# Patient Record
Sex: MALE | Race: WHITE | NOT HISPANIC OR LATINO | Employment: OTHER | URBAN - METROPOLITAN AREA
[De-identification: names, ages, dates, MRNs, and addresses within clinical notes are randomized per-mention and may not be internally consistent; named-entity substitution may affect disease eponyms.]

---

## 2019-10-08 ENCOUNTER — TRANSCRIBE ORDERS (OUTPATIENT)
Dept: ADMINISTRATIVE | Facility: HOSPITAL | Age: 79
End: 2019-10-08

## 2019-10-08 DIAGNOSIS — I25.10 CAD IN NATIVE ARTERY: Primary | ICD-10-CM

## 2019-10-08 DIAGNOSIS — I48.91 ATRIAL FIBRILLATION, UNSPECIFIED TYPE (HCC): ICD-10-CM

## 2019-10-09 ENCOUNTER — TRANSCRIBE ORDERS (OUTPATIENT)
Dept: ADMINISTRATIVE | Facility: HOSPITAL | Age: 79
End: 2019-10-09

## 2019-10-09 DIAGNOSIS — I25.10 DISEASE OF CARDIOVASCULAR SYSTEM: Primary | ICD-10-CM

## 2019-10-09 DIAGNOSIS — I48.20 CHRONIC ATRIAL FIBRILLATION (HCC): ICD-10-CM

## 2019-10-17 ENCOUNTER — HOSPITAL ENCOUNTER (OUTPATIENT)
Dept: NUCLEAR MEDICINE | Facility: HOSPITAL | Age: 79
Discharge: HOME | End: 2019-10-17

## 2019-10-17 DIAGNOSIS — I48.20 CHRONIC ATRIAL FIBRILLATION (HCC): ICD-10-CM

## 2019-10-17 DIAGNOSIS — I25.10 DISEASE OF CARDIOVASCULAR SYSTEM: ICD-10-CM

## 2019-10-17 LAB
BH CV STRESS BP STAGE 1: NORMAL
BH CV STRESS BP STAGE 2: NORMAL
BH CV STRESS DURATION MIN STAGE 1: 3
BH CV STRESS DURATION MIN STAGE 2: 1
BH CV STRESS DURATION SEC STAGE 1: 0
BH CV STRESS DURATION SEC STAGE 2: 35
BH CV STRESS GRADE STAGE 1: 10
BH CV STRESS GRADE STAGE 2: 12
BH CV STRESS HR STAGE 1: 118
BH CV STRESS HR STAGE 2: 85
BH CV STRESS METS STAGE 1: 5
BH CV STRESS METS STAGE 2: 7.5
BH CV STRESS PROTOCOL 1: NORMAL
BH CV STRESS PROTOCOL 2 COMMENTS STAGE 1: NORMAL
BH CV STRESS PROTOCOL 2 DOSE REGADENOSON STAGE 1: 0.4
BH CV STRESS PROTOCOL 2 DURATION MIN STAGE 1: 0
BH CV STRESS PROTOCOL 2 DURATION SEC STAGE 1: 10
BH CV STRESS PROTOCOL 2 STAGE 1: 1
BH CV STRESS PROTOCOL 2: NORMAL
BH CV STRESS RECOVERY BP: NORMAL MMHG
BH CV STRESS RECOVERY HR: 56 BPM
BH CV STRESS SPEED STAGE 1: 1.7
BH CV STRESS SPEED STAGE 2: 2.5
BH CV STRESS STAGE 1: 1
BH CV STRESS STAGE 2: 2
LV EF NUC BP: 70 %
MAXIMAL PREDICTED HEART RATE: 139 BPM
PERCENT MAX PREDICTED HR: 52.52 %
STRESS BASELINE BP: NORMAL MMHG
STRESS BASELINE HR: 53 BPM
STRESS PERCENT HR: 62 %
STRESS POST PEAK BP: NORMAL MMHG
STRESS POST PEAK HR: 73 BPM
STRESS TARGET HR: 118 BPM

## 2019-10-17 PROCEDURE — 93016 CV STRESS TEST SUPVJ ONLY: CPT | Performed by: INTERNAL MEDICINE

## 2019-10-17 PROCEDURE — 78452 HT MUSCLE IMAGE SPECT MULT: CPT | Performed by: INTERNAL MEDICINE

## 2019-10-17 PROCEDURE — 93017 CV STRESS TEST TRACING ONLY: CPT

## 2019-10-17 PROCEDURE — 0 TECHNETIUM SESTAMIBI: Performed by: INTERNAL MEDICINE

## 2019-10-17 PROCEDURE — A9500 TC99M SESTAMIBI: HCPCS | Performed by: INTERNAL MEDICINE

## 2019-10-17 PROCEDURE — 25010000002 REGADENOSON 0.4 MG/5ML SOLUTION: Performed by: INTERNAL MEDICINE

## 2019-10-17 PROCEDURE — 78452 HT MUSCLE IMAGE SPECT MULT: CPT

## 2019-10-17 PROCEDURE — 93018 CV STRESS TEST I&R ONLY: CPT | Performed by: INTERNAL MEDICINE

## 2019-10-17 RX ORDER — ATORVASTATIN CALCIUM 10 MG/1
1 TABLET, FILM COATED ORAL NIGHTLY
COMMUNITY
Start: 2019-07-28 | End: 2021-08-22

## 2019-10-17 RX ORDER — HYDROCHLOROTHIAZIDE 12.5 MG/1
1 TABLET ORAL DAILY
COMMUNITY
Start: 2019-10-06 | End: 2021-03-31

## 2019-10-17 RX ORDER — LEVOTHYROXINE, LIOTHYRONINE 38; 9 UG/1; UG/1
1 TABLET ORAL DAILY
COMMUNITY
Start: 2019-07-28 | End: 2021-06-02 | Stop reason: SDUPTHER

## 2019-10-17 RX ORDER — AMLODIPINE BESYLATE 5 MG/1
0.5 TABLET ORAL DAILY
COMMUNITY
Start: 2019-07-20 | End: 2021-08-22

## 2019-10-17 RX ORDER — RIVAROXABAN 20 MG/1
1 TABLET, FILM COATED ORAL NIGHTLY
COMMUNITY
Start: 2019-09-17 | End: 2021-12-05

## 2019-10-17 RX ORDER — SOTALOL HYDROCHLORIDE 80 MG/1
1 TABLET ORAL 2 TIMES DAILY
COMMUNITY
Start: 2019-08-27 | End: 2021-11-22

## 2019-10-17 RX ORDER — TAMSULOSIN HYDROCHLORIDE 0.4 MG/1
1 CAPSULE ORAL 2 TIMES DAILY
COMMUNITY
Start: 2019-08-27 | End: 2022-02-18

## 2019-10-17 RX ORDER — CHOLECALCIFEROL (VITAMIN D3) 25 MCG
1 TABLET,CHEWABLE ORAL DAILY
COMMUNITY

## 2019-10-17 RX ADMIN — REGADENOSON 0.4 MG: 0.08 INJECTION, SOLUTION INTRAVENOUS at 09:34

## 2019-10-17 RX ADMIN — TECHNETIUM TC 99M SESTAMIBI 1 DOSE: 1 INJECTION INTRAVENOUS at 07:30

## 2019-10-17 RX ADMIN — TECHNETIUM TC 99M SESTAMIBI 1 DOSE: 1 INJECTION INTRAVENOUS at 09:34

## 2021-03-04 ENCOUNTER — TELEPHONE (OUTPATIENT)
Dept: INTERNAL MEDICINE | Facility: CLINIC | Age: 81
End: 2021-03-04

## 2021-03-04 NOTE — TELEPHONE ENCOUNTER
PATIENT STATES: that he would like to be on silduenafil 20mg prior authorization number is [id number]     PATIENT CAN BE REACHED ON:[phone]    [facility] [address] - [phone]  - [phone]   [phone]

## 2021-03-05 NOTE — TELEPHONE ENCOUNTER
He had not been seen in a while.  I would probably schedule an appointment if were going to start this.  It could be virtual visit

## 2021-03-16 ENCOUNTER — OFFICE VISIT (OUTPATIENT)
Dept: INTERNAL MEDICINE | Facility: CLINIC | Age: 81
End: 2021-03-16

## 2021-03-16 VITALS
HEART RATE: 80 BPM | BODY MASS INDEX: 27.06 KG/M2 | SYSTOLIC BLOOD PRESSURE: 110 MMHG | OXYGEN SATURATION: 97 % | RESPIRATION RATE: 16 BRPM | TEMPERATURE: 97.3 F | HEIGHT: 70 IN | DIASTOLIC BLOOD PRESSURE: 62 MMHG | WEIGHT: 189 LBS

## 2021-03-16 DIAGNOSIS — N40.0 BENIGN PROSTATIC HYPERPLASIA WITHOUT LOWER URINARY TRACT SYMPTOMS: ICD-10-CM

## 2021-03-16 DIAGNOSIS — Z12.5 SPECIAL SCREENING FOR MALIGNANT NEOPLASM OF PROSTATE: ICD-10-CM

## 2021-03-16 DIAGNOSIS — I48.11 LONGSTANDING PERSISTENT ATRIAL FIBRILLATION (HCC): ICD-10-CM

## 2021-03-16 DIAGNOSIS — E78.2 MIXED HYPERLIPIDEMIA: Primary | ICD-10-CM

## 2021-03-16 DIAGNOSIS — I10 ESSENTIAL HYPERTENSION: ICD-10-CM

## 2021-03-16 DIAGNOSIS — H61.23 CERUMEN DEBRIS ON TYMPANIC MEMBRANE OF BOTH EARS: ICD-10-CM

## 2021-03-16 PROCEDURE — 69210 REMOVE IMPACTED EAR WAX UNI: CPT | Performed by: INTERNAL MEDICINE

## 2021-03-16 PROCEDURE — 99214 OFFICE O/P EST MOD 30 MIN: CPT | Performed by: INTERNAL MEDICINE

## 2021-03-16 RX ORDER — SILDENAFIL CITRATE 20 MG/1
TABLET ORAL
Qty: 30 TABLET | Refills: 3 | Status: SHIPPED | OUTPATIENT
Start: 2021-03-16 | End: 2022-11-03 | Stop reason: ALTCHOICE

## 2021-03-16 NOTE — PROGRESS NOTES
"Chief Complaint  Atrial Fibrillation, Hyperlipidemia, and Hypertension    Subjective          Wilberto Madison presents to [facility]  History of Present Illness  Denies any significant headache, shortness of breath or chest pain.  No visual changes.  Taking medication without complication.  Overall he is doing well.  His wife has been moved to a different facility for her dementia.  He is still currently living in [place].  No medication changes.  He is interested in a prescription of sildenafil  Objective   Vital Signs:   /62 (BP Location: Left arm, Patient Position: Sitting, Cuff Size: Large Adult)   Pulse 80   Temp 97.3 °F (36.3 °C) (Temporal)   Resp 16   Ht 177.8 cm (70\")   Wt 85.7 kg (189 lb)   SpO2 97%   BMI 27.12 kg/m²     Physical Exam  Vitals and nursing note reviewed.   Constitutional:       Appearance: Normal appearance.   HENT:      Head: Normocephalic and atraumatic.   Cardiovascular:      Rate and Rhythm: Normal rate and regular rhythm.   Pulmonary:      Effort: Pulmonary effort is normal.      Breath sounds: Normal breath sounds.   Abdominal:      General: Abdomen is flat.      Palpations: Abdomen is soft.   Musculoskeletal:         General: No swelling or deformity.      Cervical back: Neck supple.      Right lower leg: No edema.      Left lower leg: No edema.   Skin:     General: Skin is warm.      Capillary Refill: Capillary refill takes less than 2 seconds.      Findings: No rash.   Neurological:      General: No focal deficit present.      Mental Status: He is alert and oriented to person, place, and time.        Result Review :                 Assessment and Plan atrial fibrillation stable.  No medication changes.  Hypothyroidism, recheck TSH  BPH in ED.  Flomax.  Sildenafil 20 mg prescription given.  Bilateral cerumen impaction.  Cleared via irrigation and cerumen spoon  Schedule wellness in 3 months  Ear Cerumen " Removal    Date/Time: 3/16/2021 1:27 PM  Performed by: Larry Schofield MD (Tony)  Authorized by: Larry Schofield MD (Tony)     Anesthesia:  Local Anesthetic: none  Location details: right ear and left ear  Patient tolerance: patient tolerated the procedure well with no immediate complications  Procedure type: instrumentation and irrigation   Sedation:  Patient sedated: no          Diagnoses and all orders for this visit:    1. Mixed hyperlipidemia (Primary)  -     CBC & Differential  -     Comprehensive Metabolic Panel  -     Lipid Panel With / Chol / HDL Ratio  -     TSH  -     PSA Screen    2. Essential hypertension  -     CBC & Differential  -     Comprehensive Metabolic Panel  -     Lipid Panel With / Chol / HDL Ratio  -     TSH  -     PSA Screen    3. Longstanding persistent atrial fibrillation (CMS/HCC)  -     CBC & Differential  -     Comprehensive Metabolic Panel  -     Lipid Panel With / Chol / HDL Ratio  -     TSH  -     PSA Screen    4. Benign prostatic hyperplasia without lower urinary tract symptoms  -     CBC & Differential  -     Comprehensive Metabolic Panel  -     Lipid Panel With / Chol / HDL Ratio  -     TSH  -     PSA Screen    5. Special screening for malignant neoplasm of prostate  -     PSA Screen    6. Cerumen debris on tympanic membrane of both ears  -     Ear Cerumen Removal    Other orders  -     sildenafil (REVATIO) 20 MG tablet; 1 to 2 tablets 2 hours prior to activity  Dispense: 30 tablet; Refill: 3        Follow Up   No follow-ups on file.  Patient was given instructions and counseling regarding his condition or for health maintenance advice. Please see specific information pulled into the AVS if appropriate.

## 2021-03-17 LAB
ALBUMIN SERPL-MCNC: 4 G/DL (ref 3.5–5.2)
ALBUMIN/GLOB SERPL: 1.7 G/DL
ALP SERPL-CCNC: 73 U/L (ref 39–117)
ALT SERPL-CCNC: 17 U/L (ref 1–41)
AST SERPL-CCNC: 21 U/L (ref 1–40)
BASOPHILS # BLD AUTO: 0.03 10*3/MM3 (ref 0–0.2)
BASOPHILS NFR BLD AUTO: 0.5 % (ref 0–1.5)
BILIRUB SERPL-MCNC: 0.8 MG/DL (ref 0–1.2)
BUN SERPL-MCNC: 17 MG/DL (ref 8–23)
BUN/CREAT SERPL: 14.5 (ref 7–25)
CALCIUM SERPL-MCNC: 9.2 MG/DL (ref 8.6–10.5)
CHLORIDE SERPL-SCNC: 96 MMOL/L (ref 98–107)
CHOLEST SERPL-MCNC: 112 MG/DL (ref 0–200)
CHOLEST/HDLC SERPL: 2.87 {RATIO}
CO2 SERPL-SCNC: 31.4 MMOL/L (ref 22–29)
CREAT SERPL-MCNC: 1.17 MG/DL (ref 0.76–1.27)
EOSINOPHIL # BLD AUTO: 0.13 10*3/MM3 (ref 0–0.4)
EOSINOPHIL NFR BLD AUTO: 2.1 % (ref 0.3–6.2)
ERYTHROCYTE [DISTWIDTH] IN BLOOD BY AUTOMATED COUNT: 11.7 % (ref 12.3–15.4)
GLOBULIN SER CALC-MCNC: 2.4 GM/DL
GLUCOSE SERPL-MCNC: 137 MG/DL (ref 65–99)
HCT VFR BLD AUTO: 46 % (ref 37.5–51)
HDLC SERPL-MCNC: 39 MG/DL (ref 40–60)
HGB BLD-MCNC: 15.6 G/DL (ref 13–17.7)
IMM GRANULOCYTES # BLD AUTO: 0.02 10*3/MM3 (ref 0–0.05)
IMM GRANULOCYTES NFR BLD AUTO: 0.3 % (ref 0–0.5)
LDLC SERPL CALC-MCNC: 53 MG/DL (ref 0–100)
LYMPHOCYTES # BLD AUTO: 0.86 10*3/MM3 (ref 0.7–3.1)
LYMPHOCYTES NFR BLD AUTO: 14.2 % (ref 19.6–45.3)
MCH RBC QN AUTO: 31 PG (ref 26.6–33)
MCHC RBC AUTO-ENTMCNC: 33.9 G/DL (ref 31.5–35.7)
MCV RBC AUTO: 91.5 FL (ref 79–97)
MONOCYTES # BLD AUTO: 0.39 10*3/MM3 (ref 0.1–0.9)
MONOCYTES NFR BLD AUTO: 6.4 % (ref 5–12)
NEUTROPHILS # BLD AUTO: 4.62 10*3/MM3 (ref 1.7–7)
NEUTROPHILS NFR BLD AUTO: 76.5 % (ref 42.7–76)
NRBC BLD AUTO-RTO: 0.2 /100 WBC (ref 0–0.2)
PLATELET # BLD AUTO: 197 10*3/MM3 (ref 140–450)
POTASSIUM SERPL-SCNC: 3.7 MMOL/L (ref 3.5–5.2)
PROT SERPL-MCNC: 6.4 G/DL (ref 6–8.5)
PSA SERPL-MCNC: 5.37 NG/ML (ref 0–4)
RBC # BLD AUTO: 5.03 10*6/MM3 (ref 4.14–5.8)
SODIUM SERPL-SCNC: 137 MMOL/L (ref 136–145)
TRIGL SERPL-MCNC: 107 MG/DL (ref 0–150)
TSH SERPL DL<=0.005 MIU/L-ACNC: 1.86 UIU/ML (ref 0.27–4.2)
VLDLC SERPL CALC-MCNC: 20 MG/DL (ref 5–40)
WBC # BLD AUTO: 6.05 10*3/MM3 (ref 3.4–10.8)

## 2021-03-24 ENCOUNTER — TELEPHONE (OUTPATIENT)
Dept: INTERNAL MEDICINE | Facility: CLINIC | Age: 81
End: 2021-03-24

## 2021-03-24 NOTE — TELEPHONE ENCOUNTER
Caller: Wilberto Madison    Relationship to patient: Self    Best call back number: [phone]    Patient is needing:     PATIENT CALLED IN WANTING TO LEAVE A MESSAGE FOR SETH    [facility] FOR sildenafil (REVATIO) 20 MG tablet  NEEDS PRIOR AUTHORIZATION BEFORE GETTING FILLED AND DELIVERED.   HE PROVIDED A PHONE NUMBER FOR US TO CALL IN ORDER TO ACCOMPLISH THIS.   [phone]       PLEASE CALL AND ADVISE: [phone]

## 2021-03-31 RX ORDER — HYDROCHLOROTHIAZIDE 12.5 MG/1
TABLET ORAL
Qty: 90 TABLET | Refills: 3 | Status: SHIPPED | OUTPATIENT
Start: 2021-03-31 | End: 2022-03-28

## 2021-05-27 ENCOUNTER — TELEPHONE (OUTPATIENT)
Dept: INTERNAL MEDICINE | Facility: CLINIC | Age: 81
End: 2021-05-27

## 2021-05-27 NOTE — TELEPHONE ENCOUNTER
Caller: Wilberto Madison    Relationship: Self    Best call back number: [phone]    What was the call regarding: PATIENT CALLED AND STATED THAT A NEW 90 DAY SCRIPT NEEDS TO BE SENT TO THE PHARMACY FOR NP THYROID 60 MG tablet.    [facility] - [address] [phone] [facility] [phone] FX

## 2021-05-28 NOTE — TELEPHONE ENCOUNTER
Looks was done in early May.  He needs an appointment anyway, has not been seen since [employer]

## 2021-06-02 ENCOUNTER — OFFICE VISIT (OUTPATIENT)
Dept: INTERNAL MEDICINE | Facility: CLINIC | Age: 81
End: 2021-06-02

## 2021-06-02 VITALS
DIASTOLIC BLOOD PRESSURE: 72 MMHG | TEMPERATURE: 97.1 F | BODY MASS INDEX: 27.35 KG/M2 | WEIGHT: 191 LBS | SYSTOLIC BLOOD PRESSURE: 120 MMHG | RESPIRATION RATE: 16 BRPM | OXYGEN SATURATION: 99 % | HEIGHT: 70 IN | HEART RATE: 52 BPM

## 2021-06-02 DIAGNOSIS — I48.11 LONGSTANDING PERSISTENT ATRIAL FIBRILLATION (HCC): Primary | ICD-10-CM

## 2021-06-02 DIAGNOSIS — I10 ESSENTIAL HYPERTENSION: ICD-10-CM

## 2021-06-02 DIAGNOSIS — Z12.11 COLON CANCER SCREENING: ICD-10-CM

## 2021-06-02 PROCEDURE — 99214 OFFICE O/P EST MOD 30 MIN: CPT | Performed by: INTERNAL MEDICINE

## 2021-06-02 RX ORDER — NITROGLYCERIN 0.4 MG/1
0.4 TABLET SUBLINGUAL
Status: DISCONTINUED | OUTPATIENT
Start: 2021-06-02 | End: 2021-06-02

## 2021-06-02 RX ORDER — NITROGLYCERIN 0.4 MG/1
0.4 TABLET SUBLINGUAL
Qty: 25 TABLET | Refills: 1 | Status: SHIPPED | OUTPATIENT
Start: 2021-06-02

## 2021-06-02 RX ORDER — LEVOTHYROXINE, LIOTHYRONINE 38; 9 UG/1; UG/1
60 TABLET ORAL DAILY
Qty: 90 TABLET | Refills: 1 | Status: SHIPPED | OUTPATIENT
Start: 2021-06-02 | End: 2021-11-11

## 2021-06-02 NOTE — PROGRESS NOTES
"Chief Complaint  Hyperlipidemia, Hypertension, and Hypothyroidism    Subjective          Wilberto Madison presents to [facility]  History of Present Illness  Denies any significant headache, shortness of breath or chest pain.  No visual changes.  Taking medication without complication.  Chronic atrial fibrillation no changes in medications.  He has been on the same medication for a while and was just questioning whether he needs to see cardiology  Erectile dysfunction.  He never got the sildenafil filled because of insurance complications.  He is currently living separate from his wife who has pretty significant dementia.  He sees her daily but is under quite a bit of stress related to that I think.  He was not interested in any medications regarding this.  Objective   Vital Signs:   /72 (BP Location: Left arm, Patient Position: Sitting, Cuff Size: Large Adult)   Pulse 52   Temp 97.1 °F (36.2 °C) (Temporal)   Resp 16   Ht 177.8 cm (70\")   Wt 86.6 kg (191 lb)   SpO2 99%   BMI 27.41 kg/m²     Physical Exam  Vitals and nursing note reviewed.   Constitutional:       Appearance: Normal appearance.   HENT:      Head: Normocephalic and atraumatic.   Cardiovascular:      Rate and Rhythm: Normal rate. Rhythm irregular.   Pulmonary:      Effort: Pulmonary effort is normal.      Breath sounds: Normal breath sounds.   Abdominal:      General: Abdomen is flat.      Palpations: Abdomen is soft.   Musculoskeletal:         General: No swelling or deformity.      Cervical back: Neck supple.      Right lower leg: No edema.      Left lower leg: No edema.   Skin:     General: Skin is warm.      Capillary Refill: Capillary refill takes less than 2 seconds.      Findings: No rash.   Neurological:      General: No focal deficit present.      Mental Status: He is alert and oriented to person, place, and time.        Result Review :                 Assessment and Plan chronic " atrial fibrillation stable.  No medication changes.  He can see cardiology whenever he likes but I do not think any changes would be warranted at this point  Stress related to his wife's dementia.  He seems to be doing well.  Living in separate residence currently but he sees her every day  ED, sildenafil prescription given.  Just told him to go to the pharmacy and pay cash without insurance it will be cheaper and easier  Colon cancer screening, Cologuard ordered  Hypertension and hyperlipidemia stable.  Refill sent in.  Thyroid refill sent in as well and do a wellness in the next 6 months or sooner if problems  Diagnoses and all orders for this visit:    1. Longstanding persistent atrial fibrillation (CMS/HCC) (Primary)  -     Discontinue: nitroglycerin (NITROSTAT) SL tablet 0.4 mg    2. Colon cancer screening  -     Cologuard - Stool, Per Rectum; Future    3. Essential hypertension    Other orders  -     nitroglycerin (Nitrostat) 0.4 MG SL tablet; Place 1 tablet under the tongue Every 5 (Five) Minutes As Needed for Chest Pain. Take no more than 3 doses in 15 minutes.  Dispense: 25 tablet; Refill: 1        Follow Up   Return in about 6 months (around 12/2/2021) for Medicare Wellness.  Patient was given instructions and counseling regarding his condition or for health maintenance advice. Please see specific information pulled into the AVS if appropriate.

## 2021-06-28 ENCOUNTER — TELEPHONE (OUTPATIENT)
Dept: INTERNAL MEDICINE | Facility: CLINIC | Age: 81
End: 2021-06-28

## 2021-06-28 NOTE — TELEPHONE ENCOUNTER
Caller: Wilberto Madison    Relationship: Self    Best call back number: [phone]    Caller requesting test results: PATIENT    What test was performed: COLOGUARD TEST RESULTS

## 2021-06-29 NOTE — TELEPHONE ENCOUNTER
Called cologaurd testing is negative, asked them to fax over the report, will call and let pt know.

## 2021-06-30 ENCOUNTER — TELEPHONE (OUTPATIENT)
Dept: INTERNAL MEDICINE | Facility: CLINIC | Age: 81
End: 2021-06-30

## 2021-06-30 NOTE — TELEPHONE ENCOUNTER
Caller: [place]    Relationship: Self    Best call back number: [phone]     What medication are you requesting: SOMETHING FOR UTI    What are your current symptoms: SORENESS IN SCROTUM AND FREQUENCY AND A LITTLE SORE.    How long have you been experiencing symptoms: ABOUT 4 OR 5 DAYS    Have you had these symptoms before:    [x] Yes  [] No    Have you been treated for these symptoms before:   [x] Yes  [] No    If a prescription is needed, what is your preferred pharmacy and phone number: [facility] [id number] [place] [address] AT [facility] [place] - [phone] [facility] [phone] FX     Additional notes:  HE STATES HE WILL COME IN IF HE HAS TO BUT WOULD LIKE TO KNOW IF DOCTOR CAN JUST SEND MEDS THROUGH FOR HIM.

## 2021-08-12 ENCOUNTER — TELEPHONE (OUTPATIENT)
Dept: INTERNAL MEDICINE | Facility: CLINIC | Age: 81
End: 2021-08-12

## 2021-08-12 NOTE — TELEPHONE ENCOUNTER
Caller: Wilberto Madison    Relationship: Self    Best call back number: [phone]    What is the medical concern/diagnosis:     What specialty or service is being requested: CARDIOLOGIST    What is the provider, practice or medical service name: ANYONE THAT DR. LANDRUM WOULD RECOMMEND        Any additional details:

## 2021-08-16 ENCOUNTER — TELEPHONE (OUTPATIENT)
Dept: INTERNAL MEDICINE | Facility: CLINIC | Age: 81
End: 2021-08-16

## 2021-08-16 NOTE — TELEPHONE ENCOUNTER
No I think he had left a message about a cardiologist.  I gave him Dr. Linn's name and Dr. Miller's name or at least a message about that

## 2021-08-16 NOTE — TELEPHONE ENCOUNTER
I S/W THE APTIENT AND RELAYED DR. LANDRUM'S MESSAGE AND GAVE HIM THE PHONE NUMBER TO THEIR OFFICE.

## 2021-08-16 NOTE — TELEPHONE ENCOUNTER
I CALLED THE PATIENT, BUT HE DID NOT ANSWER AND I COULD NOT LEAVE A MESSAGE.  I WILL CALL BACK LATER.

## 2021-08-16 NOTE — TELEPHONE ENCOUNTER
I CALLED THE PATIENT AGAIN, BUT HE DIDN'T ANSWER AND I COULD NOT LEAVE A MESSAGE.  I'LL CALL BACK LATER.

## 2021-08-22 RX ORDER — ATORVASTATIN CALCIUM 10 MG/1
TABLET, FILM COATED ORAL
Qty: 90 TABLET | Refills: 3 | Status: SHIPPED | OUTPATIENT
Start: 2021-08-22 | End: 2022-08-17

## 2021-08-22 RX ORDER — AMLODIPINE BESYLATE 5 MG/1
TABLET ORAL
Qty: 90 TABLET | Refills: 3 | Status: SHIPPED | OUTPATIENT
Start: 2021-08-22 | End: 2021-11-03

## 2021-11-03 ENCOUNTER — OFFICE VISIT (OUTPATIENT)
Dept: CARDIOLOGY | Facility: CLINIC | Age: 81
End: 2021-11-03

## 2021-11-03 VITALS
SYSTOLIC BLOOD PRESSURE: 136 MMHG | WEIGHT: 192.4 LBS | BODY MASS INDEX: 27.54 KG/M2 | HEIGHT: 70 IN | HEART RATE: 52 BPM | DIASTOLIC BLOOD PRESSURE: 70 MMHG

## 2021-11-03 DIAGNOSIS — E78.2 MIXED HYPERLIPIDEMIA: ICD-10-CM

## 2021-11-03 DIAGNOSIS — I10 PRIMARY HYPERTENSION: ICD-10-CM

## 2021-11-03 DIAGNOSIS — I25.10 CORONARY ARTERY DISEASE INVOLVING NATIVE CORONARY ARTERY OF NATIVE HEART WITHOUT ANGINA PECTORIS: ICD-10-CM

## 2021-11-03 DIAGNOSIS — I48.0 PAROXYSMAL ATRIAL FIBRILLATION (HCC): Primary | ICD-10-CM

## 2021-11-03 PROBLEM — I25.810 CORONARY ARTERY DISEASE INVOLVING CORONARY BYPASS GRAFT OF NATIVE HEART WITHOUT ANGINA PECTORIS: Status: ACTIVE | Noted: 2021-11-03

## 2021-11-03 PROCEDURE — 93000 ELECTROCARDIOGRAM COMPLETE: CPT | Performed by: INTERNAL MEDICINE

## 2021-11-03 PROCEDURE — 99204 OFFICE O/P NEW MOD 45 MIN: CPT | Performed by: INTERNAL MEDICINE

## 2021-11-03 RX ORDER — AMLODIPINE BESYLATE 5 MG/1
5 TABLET ORAL DAILY
Qty: 90 TABLET | Refills: 3 | Status: SHIPPED | OUTPATIENT
Start: 2021-11-03 | End: 2022-11-01

## 2021-11-03 NOTE — PROGRESS NOTES
Date of Office Visit: 2021  Encounter Provider: Any Linn MD  Place of Service: [facility] CARDIOLOGY  Patient Name: Wilberto Madison  :1940      Patient ID:  Wilberto Madison is a 81 y.o. male is here for CAD and PAF.          History of Present Illness    He has a history of a myocardial infarction with angioplasty done in , paroxysmal atrial fibrillation, hypertension, hyperlipidemia.    His father and brother both had heart disease and his mother strokes.    He is , has 3 children, is retired , has a couple cups of coffee per day and 1 glass of wine daily.  He smoked a pipe but quit .    Labs on 3/16/2021 show glucose 137, otherwise normal CMP, normal TSH, total cholesterol 112, HDL 39, LDL 53, triglycerides 107, normal CBC.  He had a nonischemic stress nuclear perfusion study done 10/17/2019.    He has had no chest tightness or pressure.  He has had no dizziness or syncope.  He knows when he is in atrial fibrillation.  He takes his medications as directed without difficulty.  He tries to walk 1 mile a day.  He has no orthopnea or PND.  He has no fevers, chills or cough.  He has maintained a good energy level and has a good appetite and sleeps well.  His wife is living in a memory care facility at [facility] and he resides at the [facility].  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    Past Medical History:   Diagnosis Date   • Atrial fibrillation (HCC)    • Benign prostatic hyperplasia without lower urinary tract symptoms    • ED (erectile dysfunction)    • H/O bone density study 2015    [facility]   • Hyperlipidemia    • Hypertension    • Paroxysmal atrial fibrillation (HCC)          Past Surgical History:   Procedure Laterality Date   • ANGIOPLASTY     • BACK SURGERY     • BIOPSY PROSTATE NEEDLE / PUNCH / INCISIONAL     •  COLONOSCOPY      NEG   • LAMINECTOMY      LUMBAR       Current Outpatient Medications on File Prior to Visit   Medication Sig Dispense Refill   • atorvastatin (LIPITOR) 10 MG tablet TAKE 1 TABLET DAILY 90 tablet 3   • Cholecalciferol (VITAMIN D3) 5000 units capsule capsule Take 5,000 Units by mouth Every Night.     • Cyanocobalamin (B-12) 1000 MCG capsule Take 1 capsule by mouth Daily.     • hydroCHLOROthiazide (HYDRODIURIL) 12.5 MG tablet TAKE 1 TABLET DAILY IN THE MORNING 90 tablet 3   • nitroglycerin (Nitrostat) 0.4 MG SL tablet Place 1 tablet under the tongue Every 5 (Five) Minutes As Needed for Chest Pain. Take no more than 3 doses in 15 minutes. 25 tablet 1   • NP Thyroid 60 MG tablet Take 1 tablet by mouth Daily. 90 tablet 1   • sildenafil (REVATIO) 20 MG tablet 1 to 2 tablets 2 hours prior to activity 30 tablet 3   • sotalol (BETAPACE) 80 MG tablet Take 1 tablet by mouth 2 (Two) Times a Day.     • tamsulosin (FLOMAX) 0.4 MG capsule 24 hr capsule Take 1 capsule by mouth 2 (Two) Times a Day.     • XARELTO 20 MG tablet Take 1 tablet by mouth Every Night.     • [DISCONTINUED] amLODIPine (NORVASC) 5 MG tablet TAKE 1 TABLET DAILY (Patient taking differently: 2.5 mg.) 90 tablet 3     No current facility-administered medications on file prior to visit.       Social History     Socioeconomic History   • Marital status:    Tobacco Use   • Smoking status: Former Smoker     Types: Pipe     Quit date: 1980     Years since quittin.8   • Smokeless tobacco: Never Used   Vaping Use   • Vaping Use: Never used   Substance and Sexual Activity   • Alcohol use: Yes     Alcohol/week: 7.0 standard drinks     Types: 7 Glasses of wine per week     Comment: 1 glass of wine nightly//Caffeine use: 2 cups daily   • Drug use: No   • Sexual activity: Defer           ROS    Procedures    ECG 12 Lead    Date/Time: 11/3/2021 10:46 AM  Performed by: Any Linn MD  Authorized by: Any Linn MD  "  Comparison: compared with previous ECG   Similar to previous ECG  Rhythm: sinus rhythm  T flattening: I, aVL and V6    Clinical impression: non-specific ECG                Objective:      Vitals:    11/03/21 1028 11/03/21 1032   BP: 128/70 136/70   BP Location: Left arm Right arm   Pulse: 52    Weight: 87.3 kg (192 lb 6.4 oz)    Height: 177.8 cm (70\")      Body mass index is 27.61 kg/m².    Vitals reviewed.   Constitutional:       General: Not in acute distress.     Appearance: Well-developed. Not diaphoretic.   Eyes:      General: No scleral icterus.     Conjunctiva/sclera: Conjunctivae normal.   HENT:      Head: Normocephalic and atraumatic.   Neck:      Thyroid: No thyromegaly.      Vascular: No carotid bruit or JVD.      Lymphadenopathy: No cervical adenopathy.   Pulmonary:      Effort: Pulmonary effort is normal. No respiratory distress.      Breath sounds: Normal breath sounds. No wheezing. No rhonchi. No rales.   Chest:      Chest wall: Not tender to palpatation.   Cardiovascular:      Normal rate. Regular rhythm.      Murmurs: There is no murmur.      No gallop.   Pulses:     Intact distal pulses.   Edema:     Peripheral edema absent.   Abdominal:      General: Bowel sounds are normal. There is no distension or abdominal bruit.      Palpations: Abdomen is soft. There is no abdominal mass.      Tenderness: There is no abdominal tenderness.   Musculoskeletal:         General: No deformity.      Extremities: No clubbing present.     Cervical back: Neck supple. Skin:     General: Skin is warm and dry. There is no cyanosis.      Coloration: Skin is not pale.      Findings: No rash.   Neurological:      Mental Status: Alert and oriented to person, place, and time.      Cranial Nerves: No cranial nerve deficit.   Psychiatric:         Judgment: Judgment normal.         Lab Review:       Assessment:      Diagnosis Plan   1. Paroxysmal atrial fibrillation (HCC)  Adult Transthoracic Echo Complete W/ Cont if Necessary " Per Protocol   2. Mixed hyperlipidemia     3. Primary hypertension     4. Coronary artery disease involving native coronary artery of native heart without angina pectoris       1. CAD with history of angioplasty to LAD in 1988, no angina.  2. Hypertension, goal less than 120/80, maintain his current medications including amlodipine 5 mg daily and hydrochlorothiazide 12.5 mg daily.  3. PAF, on sotalol and Xarelto.  Maintain.  4. Hyperlipidemia, on atorvastatin.     Plan:       Advised that he maintain his activity levels and get involved more as he is really had a difficult time with his wife who has advancing dementia.  We will check an echo in 1 year with a visit with Ivelisse the same day, no medication changes.  Overall he is doing well.

## 2021-11-11 RX ORDER — LEVOTHYROXINE, LIOTHYRONINE 38; 9 UG/1; UG/1
TABLET ORAL
Qty: 90 TABLET | Refills: 3 | Status: SHIPPED | OUTPATIENT
Start: 2021-11-11 | End: 2022-03-21 | Stop reason: ALTCHOICE

## 2021-11-11 NOTE — TELEPHONE ENCOUNTER
Rx Refill Note  Requested Prescriptions     Pending Prescriptions Disp Refills   • NP Thyroid 60 MG tablet [Pharmacy Med Name: NP THYROID TABS 1GR 60MG] 90 tablet 3     Sig: TAKE 1 TABLET DAILY      Last office visit with prescribing clinician: 6/2/2021      Next office visit with prescribing clinician: 12/7/2021            Alvina Lim MA  11/11/21, 07:45 EST

## 2021-11-22 RX ORDER — SOTALOL HYDROCHLORIDE 80 MG/1
TABLET ORAL
Qty: 180 TABLET | Refills: 3 | Status: SHIPPED | OUTPATIENT
Start: 2021-11-22 | End: 2022-11-15

## 2021-12-05 RX ORDER — RIVAROXABAN 20 MG/1
TABLET, FILM COATED ORAL
Qty: 90 TABLET | Refills: 3 | Status: SHIPPED | OUTPATIENT
Start: 2021-12-05 | End: 2022-11-30

## 2021-12-07 ENCOUNTER — OFFICE VISIT (OUTPATIENT)
Dept: INTERNAL MEDICINE | Facility: CLINIC | Age: 81
End: 2021-12-07

## 2021-12-07 VITALS
RESPIRATION RATE: 16 BRPM | TEMPERATURE: 96.9 F | OXYGEN SATURATION: 97 % | HEART RATE: 55 BPM | BODY MASS INDEX: 27.63 KG/M2 | SYSTOLIC BLOOD PRESSURE: 132 MMHG | WEIGHT: 193 LBS | DIASTOLIC BLOOD PRESSURE: 82 MMHG | HEIGHT: 70 IN

## 2021-12-07 DIAGNOSIS — N40.0 BENIGN PROSTATIC HYPERPLASIA WITHOUT LOWER URINARY TRACT SYMPTOMS: ICD-10-CM

## 2021-12-07 DIAGNOSIS — I10 ESSENTIAL HYPERTENSION: ICD-10-CM

## 2021-12-07 DIAGNOSIS — E78.2 MIXED HYPERLIPIDEMIA: ICD-10-CM

## 2021-12-07 DIAGNOSIS — Z00.00 ROUTINE GENERAL MEDICAL EXAMINATION AT A HEALTH CARE FACILITY: Primary | ICD-10-CM

## 2021-12-07 DIAGNOSIS — I48.11 LONGSTANDING PERSISTENT ATRIAL FIBRILLATION (HCC): ICD-10-CM

## 2021-12-07 PROCEDURE — 1160F RVW MEDS BY RX/DR IN RCRD: CPT | Performed by: INTERNAL MEDICINE

## 2021-12-07 PROCEDURE — 1170F FXNL STATUS ASSESSED: CPT | Performed by: INTERNAL MEDICINE

## 2021-12-07 PROCEDURE — G0439 PPPS, SUBSEQ VISIT: HCPCS | Performed by: INTERNAL MEDICINE

## 2021-12-07 NOTE — PROGRESS NOTES
The ABCs of the Annual Wellness Visit  Subsequent Medicare Wellness Visit    Chief Complaint   Patient presents with   • Medicare Wellness-subsequent      Subjective    History of Present Illness:  Wilberto Madison is a 81 y.o. male who presents for a Subsequent Medicare Wellness Visit.  His wife in in NH with Dementia and doing pretty well overall.  The following portions of the patient's history were reviewed and   updated as appropriate: allergies, current medications, past family history, past medical history, past social history, past surgical history and problem list.    Compared to one year ago, the patient feels his physical   health is the same.    Compared to one year ago, the patient feels his mental   health is the same.    Recent Hospitalizations:  He was not admitted to the hospital during the last year.       Current Medical Providers:  Patient Care Team:  Larry Schofield MD (Tony) as PCP - General (Internal Medicine)    Outpatient Medications Prior to Visit   Medication Sig Dispense Refill   • amLODIPine (NORVASC) 5 MG tablet Take 1 tablet by mouth Daily. 90 tablet 3   • atorvastatin (LIPITOR) 10 MG tablet TAKE 1 TABLET DAILY 90 tablet 3   • Cholecalciferol (VITAMIN D3) 5000 units capsule capsule Take 5,000 Units by mouth Every Night.     • Cyanocobalamin (B-12) 1000 MCG capsule Take 1 capsule by mouth Daily.     • hydroCHLOROthiazide (HYDRODIURIL) 12.5 MG tablet TAKE 1 TABLET DAILY IN THE MORNING 90 tablet 3   • nitroglycerin (Nitrostat) 0.4 MG SL tablet Place 1 tablet under the tongue Every 5 (Five) Minutes As Needed for Chest Pain. Take no more than 3 doses in 15 minutes. 25 tablet 1   • NP Thyroid 60 MG tablet TAKE 1 TABLET DAILY 90 tablet 3   • sildenafil (REVATIO) 20 MG tablet 1 to 2 tablets 2 hours prior to activity 30 tablet 3   • sotalol (BETAPACE) 80 MG tablet TAKE 1 TABLET TWICE A  tablet 3   • tamsulosin (FLOMAX) 0.4 MG capsule 24 hr capsule Take 1 capsule by mouth 2 (Two)  "Times a Day.     • Xarelto 20 MG tablet TAKE 1 TABLET DAILY 90 tablet 3     No facility-administered medications prior to visit.       No opioid medication identified on active medication list. I have reviewed chart for other potential  high risk medication/s and harmful drug interactions in the elderly.          Aspirin is not on active medication list.  Aspirin use is not indicated based on review of current medical condition/s. Risk of harm outweighs potential benefits.  .    Patient Active Problem List   Diagnosis   • Paroxysmal atrial fibrillation (HCC)   • Hyperlipidemia   • Hypertension   • Benign prostatic hyperplasia without lower urinary tract symptoms   • Coronary artery disease involving native coronary artery of native heart without angina pectoris     Advance Care Planning  Advance Directive is on file.  ACP discussion was held with the patient during this visit. Patient has an advance directive in EMR which is still valid.           Objective    Vitals:    12/07/21 0919   BP: 132/82   BP Location: Left arm   Patient Position: Sitting   Cuff Size: Large Adult   Pulse: 55   Resp: 16   Temp: 96.9 °F (36.1 °C)   TempSrc: Temporal   SpO2: 97%   Weight: 87.5 kg (193 lb)   Height: 177.8 cm (70\")     BMI Readings from Last 1 Encounters:   12/07/21 27.69 kg/m²   BMI is above normal parameters. Recommendations include: educational material and exercise counseling    Does the patient have evidence of cognitive impairment? No    Physical Exam  Vitals and nursing note reviewed.   Constitutional:       General: He is not in acute distress.     Appearance: Normal appearance. He is not ill-appearing, toxic-appearing or diaphoretic.   HENT:      Head: Normocephalic and atraumatic.      Nose: Nose normal.      Mouth/Throat:      Mouth: Mucous membranes are moist.      Pharynx: Oropharynx is clear.   Eyes:      Conjunctiva/sclera: Conjunctivae normal.      Pupils: Pupils are equal, round, and reactive to light. "   Cardiovascular:      Rate and Rhythm: Normal rate and regular rhythm.      Pulses: Normal pulses.      Heart sounds: Normal heart sounds. No murmur heard.  No friction rub. No gallop.    Pulmonary:      Effort: Pulmonary effort is normal.      Breath sounds: Normal breath sounds. No stridor. No wheezing, rhonchi or rales.   Abdominal:      General: Abdomen is flat. Bowel sounds are normal.      Palpations: Abdomen is soft. There is no mass.      Tenderness: There is no abdominal tenderness. There is no guarding or rebound.   Musculoskeletal:      Cervical back: Neck supple.   Skin:     General: Skin is warm and dry.      Capillary Refill: Capillary refill takes 2 to 3 seconds.   Neurological:      General: No focal deficit present.      Mental Status: He is alert and oriented to person, place, and time.   Psychiatric:         Mood and Affect: Mood normal.         Thought Content: Thought content normal.                 HEALTH RISK ASSESSMENT    Smoking Status:  Social History     Tobacco Use   Smoking Status Former Smoker   • Types: Pipe   • Quit date:    • Years since quittin.9   Smokeless Tobacco Never Used     Alcohol Consumption:  Social History     Substance and Sexual Activity   Alcohol Use Yes   • Alcohol/week: 7.0 standard drinks   • Types: 7 Glasses of wine per week    Comment: 1 glass of wine nightly//Caffeine use: 2 cups daily     Fall Risk Screen:    [facility] Fall Risk Assessment was completed, and patient is at LOW risk for falls.Assessment completed on:2021    Depression Screening:  PHQ-2/PHQ-9 Depression Screening 2021   Little interest or pleasure in doing things 0   Feeling down, depressed, or hopeless 0   Total Score 0       Health Habits and Functional and Cognitive Screening:  Functional & Cognitive Status 2021   Do you have difficulty preparing food and eating? No   Do you have difficulty bathing yourself, getting dressed or grooming yourself? No   Do you have difficulty  using the toilet? No   Do you have difficulty moving around from place to place? No   Do you have trouble with steps or getting out of a bed or a chair? No   Current Diet Well Balanced Diet   Dental Exam Up to date   Eye Exam Up to date   Exercise (times per week) 7 times per week   Current Exercises Include Walking   Do you need help using the phone?  No   Are you deaf or do you have serious difficulty hearing?  No   Do you need help with transportation? No   Do you need help shopping? No   Do you need help preparing meals?  No   Do you need help with housework?  No   Do you need help with laundry? No   Do you need help taking your medications? No   Do you need help managing money? No   Do you ever drive or ride in a car without wearing a seat belt? No   Have you felt unusual stress, anger or loneliness in the last month? No   Who do you live with? Alone   If you need help, do you have trouble finding someone available to you? No   Have you been bothered in the last four weeks by sexual problems? No   Do you have difficulty concentrating, remembering or making decisions? No       Age-appropriate Screening Schedule:  Refer to the list below for future screening recommendations based on patient's age, sex and/or medical conditions. Orders for these recommended tests are listed in the plan section. The patient has been provided with a written plan.    Health Maintenance   Topic Date Due   • ZOSTER VACCINE (2 of 2) 04/15/2015   • LIPID PANEL  03/16/2022   • TDAP/TD VACCINES (2 - Td or Tdap) 04/19/2025   • INFLUENZA VACCINE  Completed              Assessment/Plan   CMS Preventative Services Quick Reference  Risk Factors Identified During Encounter  Immunizations Discussed/Encouraged (specific Immunizations; Pneumococcal 23, Shingrix and COVID19  The above risks/problems have been discussed with the patient.  Follow up actions/plans if indicated are seen below in the Assessment/Plan Section.  Pertinent information has  been shared with the patient in the After Visit Summary.    Diagnoses and all orders for this visit:    1. Routine general medical examination at a health care facility (Primary)    2. Mixed hyperlipidemia  -     Urinalysis With Microscopic - Urine, Clean Catch  -     CBC & Differential  -     Comprehensive Metabolic Panel  -     Lipid Panel With / Chol / HDL Ratio  -     TSH    3. Longstanding persistent atrial fibrillation (HCC)  -     Urinalysis With Microscopic - Urine, Clean Catch  -     CBC & Differential  -     Comprehensive Metabolic Panel  -     Lipid Panel With / Chol / HDL Ratio  -     TSH    4. Essential hypertension  -     Urinalysis With Microscopic - Urine, Clean Catch  -     CBC & Differential  -     Comprehensive Metabolic Panel  -     Lipid Panel With / Chol / HDL Ratio  -     TSH    5. Benign prostatic hyperplasia without lower urinary tract symptoms        Follow Up:   Return in about 6 months (around 6/7/2022).     An After Visit Summary and PPPS were made available to the patient.        I spent 30 minutes caring for Wilberto on this date of service. This time includes time spent by me in the following activities:preparing for the visit, reviewing tests, obtaining and/or reviewing a separately obtained history and performing a medically appropriate examination and/or evaluation        Overall he is doing quite well.  He had one episode of atrial fibrillation but felt like it was related to mismanagement of medications.  Typically is very good at this.  He saw cardiology with no changes in treatment.  He is trying to walk about a mile a day.  His wife is in a nursing home with Alzheimer's dementia and overall is going pretty well.  He feels like he is handling the stress part okay.  Has great family support.  Check lab work and urine and follow-up 6 months or pending results

## 2021-12-08 LAB
ALBUMIN SERPL-MCNC: 4 G/DL (ref 3.6–4.6)
ALBUMIN/GLOB SERPL: 1.7 {RATIO} (ref 1.2–2.2)
ALP SERPL-CCNC: 75 IU/L (ref 44–121)
ALT SERPL-CCNC: 16 IU/L (ref 0–44)
APPEARANCE UR: CLEAR
AST SERPL-CCNC: 14 IU/L (ref 0–40)
BACTERIA #/AREA URNS HPF: NORMAL /[HPF]
BASOPHILS # BLD AUTO: 0.1 X10E3/UL (ref 0–0.2)
BASOPHILS NFR BLD AUTO: 1 %
BILIRUB SERPL-MCNC: 0.6 MG/DL (ref 0–1.2)
BILIRUB UR QL STRIP: NEGATIVE
BUN SERPL-MCNC: 14 MG/DL (ref 8–27)
BUN/CREAT SERPL: 14 (ref 10–24)
CALCIUM SERPL-MCNC: 9.2 MG/DL (ref 8.6–10.2)
CASTS URNS QL MICRO: NORMAL /LPF
CHLORIDE SERPL-SCNC: 98 MMOL/L (ref 96–106)
CHOLEST SERPL-MCNC: 116 MG/DL (ref 100–199)
CHOLEST/HDLC SERPL: 2.9 RATIO (ref 0–5)
CO2 SERPL-SCNC: 24 MMOL/L (ref 20–29)
COLOR UR: YELLOW
CREAT SERPL-MCNC: 0.97 MG/DL (ref 0.76–1.27)
EOSINOPHIL # BLD AUTO: 0.2 X10E3/UL (ref 0–0.4)
EOSINOPHIL NFR BLD AUTO: 3 %
EPI CELLS #/AREA URNS HPF: NORMAL /HPF (ref 0–10)
ERYTHROCYTE [DISTWIDTH] IN BLOOD BY AUTOMATED COUNT: 12 % (ref 11.6–15.4)
GLOBULIN SER CALC-MCNC: 2.3 G/DL (ref 1.5–4.5)
GLUCOSE SERPL-MCNC: 116 MG/DL (ref 65–99)
GLUCOSE UR QL: NEGATIVE
HCT VFR BLD AUTO: 43.7 % (ref 37.5–51)
HDLC SERPL-MCNC: 40 MG/DL
HGB BLD-MCNC: 15.1 G/DL (ref 13–17.7)
HGB UR QL STRIP: NEGATIVE
IMM GRANULOCYTES # BLD AUTO: 0 X10E3/UL (ref 0–0.1)
IMM GRANULOCYTES NFR BLD AUTO: 0 %
KETONES UR QL STRIP: NEGATIVE
LDLC SERPL CALC-MCNC: 58 MG/DL (ref 0–99)
LEUKOCYTE ESTERASE UR QL STRIP: NEGATIVE
LYMPHOCYTES # BLD AUTO: 0.8 X10E3/UL (ref 0.7–3.1)
LYMPHOCYTES NFR BLD AUTO: 13 %
MCH RBC QN AUTO: 31.9 PG (ref 26.6–33)
MCHC RBC AUTO-ENTMCNC: 34.6 G/DL (ref 31.5–35.7)
MCV RBC AUTO: 92 FL (ref 79–97)
MICRO URNS: NORMAL
MICRO URNS: NORMAL
MONOCYTES # BLD AUTO: 0.5 X10E3/UL (ref 0.1–0.9)
MONOCYTES NFR BLD AUTO: 8 %
NEUTROPHILS # BLD AUTO: 4.5 X10E3/UL (ref 1.4–7)
NEUTROPHILS NFR BLD AUTO: 75 %
NITRITE UR QL STRIP: NEGATIVE
PH UR STRIP: 6 [PH] (ref 5–7.5)
PLATELET # BLD AUTO: 183 X10E3/UL (ref 150–450)
POTASSIUM SERPL-SCNC: 3.7 MMOL/L (ref 3.5–5.2)
PROT SERPL-MCNC: 6.3 G/DL (ref 6–8.5)
PROT UR QL STRIP: NEGATIVE
RBC # BLD AUTO: 4.74 X10E6/UL (ref 4.14–5.8)
RBC #/AREA URNS HPF: NORMAL /HPF (ref 0–2)
SODIUM SERPL-SCNC: 135 MMOL/L (ref 134–144)
SP GR UR: 1.01 (ref 1–1.03)
TRIGL SERPL-MCNC: 94 MG/DL (ref 0–149)
TSH SERPL DL<=0.005 MIU/L-ACNC: 2.91 UIU/ML (ref 0.45–4.5)
UROBILINOGEN UR STRIP-MCNC: 0.2 MG/DL (ref 0.2–1)
VLDLC SERPL CALC-MCNC: 18 MG/DL (ref 5–40)
WBC # BLD AUTO: 6 X10E3/UL (ref 3.4–10.8)
WBC #/AREA URNS HPF: NORMAL /HPF (ref 0–5)

## 2022-01-11 ENCOUNTER — TELEPHONE (OUTPATIENT)
Dept: INTERNAL MEDICINE | Facility: CLINIC | Age: 82
End: 2022-01-11

## 2022-01-11 NOTE — TELEPHONE ENCOUNTER
Caller: Wilberto Madison    Relationship: Self    Best call back number: [phone]       Additional notes:    PATIENT GOT A LETTER FROM [facility] SAYING THAT NP THYROID IS NOT A PREFERRED MEDICATION FOR THE YEAR OF 2022.  THEREFORE, PATIENT IS WONDERING IF YOU CAN CHANGE HIS MEDICATION TO A PREFERRED MEDICATION.    THEY ARE AS FOLLOWS:    LEVOTHYROXINE SODIUM  LEVOXYL  UNITHROID    PLEASE CALL PATIENT AND ADVISE.

## 2022-02-18 RX ORDER — TAMSULOSIN HYDROCHLORIDE 0.4 MG/1
CAPSULE ORAL
Qty: 180 CAPSULE | Refills: 3 | Status: SHIPPED | OUTPATIENT
Start: 2022-02-18 | End: 2023-02-13

## 2022-03-18 ENCOUNTER — TELEPHONE (OUTPATIENT)
Dept: INTERNAL MEDICINE | Facility: CLINIC | Age: 82
End: 2022-03-18

## 2022-03-18 NOTE — TELEPHONE ENCOUNTER
Caller: Wilberto Madison    Relationship to patient: Self    Best call back number:[phone]     Patient is needing: PATIENT STATES [facility] IS NOT COVERED AND WILL NOT CONTINUE TO PAY FOR IT. NP Thyroid 60 MG tablet. PATIENT STATES HE WOULD LIKE TO SWITCH TO LEVOTHYROXINE SODIUM 100MG SENT TO [facility] OR WOULD BE OPEN TO OPEN TO A PRIOR AUTHROIZATION.        [facility] - [address] [phone] [facility] [phone]   [phone]

## 2022-03-21 RX ORDER — LEVOTHYROXINE SODIUM 0.1 MG/1
100 TABLET ORAL DAILY
Qty: 90 TABLET | Refills: 1 | Status: SHIPPED | OUTPATIENT
Start: 2022-03-21 | End: 2023-02-17

## 2022-03-28 RX ORDER — HYDROCHLOROTHIAZIDE 12.5 MG/1
TABLET ORAL
Qty: 90 TABLET | Refills: 3 | Status: SHIPPED | OUTPATIENT
Start: 2022-03-28 | End: 2023-03-21

## 2022-07-13 ENCOUNTER — OFFICE VISIT (OUTPATIENT)
Dept: INTERNAL MEDICINE | Facility: CLINIC | Age: 82
End: 2022-07-13

## 2022-07-13 VITALS
WEIGHT: 191 LBS | DIASTOLIC BLOOD PRESSURE: 62 MMHG | OXYGEN SATURATION: 98 % | SYSTOLIC BLOOD PRESSURE: 118 MMHG | TEMPERATURE: 96.8 F | BODY MASS INDEX: 26.74 KG/M2 | HEART RATE: 52 BPM | HEIGHT: 71 IN | RESPIRATION RATE: 16 BRPM

## 2022-07-13 DIAGNOSIS — E78.2 MIXED HYPERLIPIDEMIA: ICD-10-CM

## 2022-07-13 DIAGNOSIS — I48.11 LONGSTANDING PERSISTENT ATRIAL FIBRILLATION: ICD-10-CM

## 2022-07-13 DIAGNOSIS — I10 ESSENTIAL HYPERTENSION: Primary | ICD-10-CM

## 2022-07-13 PROCEDURE — 99214 OFFICE O/P EST MOD 30 MIN: CPT | Performed by: INTERNAL MEDICINE

## 2022-07-13 NOTE — PROGRESS NOTES
"Chief Complaint  Hypertension, Hypothyroidism, and Hyperlipidemia    Subjective        Wilberto Madison presents to [facility]  History of Present Illness  Denies any significant headache, shortness of breath or chest pain.  No visual changes.  Taking medication without complication.  He does have some ptosis on both sides but left greater than right.  He is noticed it more recently and his daughter and said something about it but it does not bother him  He is taking care of his wife who is in a assisted living/nursing home with dementia.  Feels like he is doing okay from a mood standpoint  Objective   Vital Signs:  /62 (BP Location: Left arm, Patient Position: Sitting, Cuff Size: Large Adult)   Pulse 52   Temp 96.8 °F (36 °C) (Temporal)   Resp 16   Ht 180.3 cm (71\")   Wt 86.6 kg (191 lb)   SpO2 98%   BMI 26.64 kg/m²   Estimated body mass index is 26.64 kg/m² as calculated from the following:    Height as of this encounter: 180.3 cm (71\").    Weight as of this encounter: 86.6 kg (191 lb).          Physical Exam  Vitals and nursing note reviewed.   Constitutional:       Appearance: Normal appearance.   HENT:      Head: Normocephalic and atraumatic.   Eyes:      Extraocular Movements: Extraocular movements intact.      Conjunctiva/sclera: Conjunctivae normal.      Pupils: Pupils are equal, round, and reactive to light.      Comments: PTOSIS  LEFT GREATER THAN RIGHT   Cardiovascular:      Rate and Rhythm: Normal rate and regular rhythm.   Pulmonary:      Effort: Pulmonary effort is normal.      Breath sounds: Normal breath sounds.   Abdominal:      General: Abdomen is flat.      Palpations: Abdomen is soft.   Musculoskeletal:         General: No swelling or deformity.      Cervical back: Neck supple.      Right lower leg: No edema.      Left lower leg: No edema.   Skin:     General: Skin is warm.      Capillary Refill: Capillary refill takes less than 2 " seconds.      Findings: No rash.   Neurological:      General: No focal deficit present.      Mental Status: He is alert and oriented to person, place, and time.        Result Review :           PREVIOUS LABS REVIEWED      Assessment and Plan   Diagnoses and all orders for this visit:    1. Essential hypertension (Primary)    2. Longstanding persistent atrial fibrillation (HCC)    3. Mixed hyperlipidemia    Medical problems stable.  He is overall seems to be doing well.  He is taking care of his wife and nursing home who has dementia and mental health seems to be appropriate.  He does not need any medication he did not feel like.  He has good family support.  Ptosis left greater than right.  I think this is just muscle laxity and at any point he can see the ophthalmologist.  He is wants to defer that for now.  Follow-up for wellness in 6 months         Follow Up   No follow-ups on file.  Patient was given instructions and counseling regarding his condition or for health maintenance advice. Please see specific information pulled into the AVS if appropriate.

## 2022-08-17 RX ORDER — ATORVASTATIN CALCIUM 10 MG/1
TABLET, FILM COATED ORAL
Qty: 90 TABLET | Refills: 3 | Status: SHIPPED | OUTPATIENT
Start: 2022-08-17

## 2022-11-01 RX ORDER — AMLODIPINE BESYLATE 5 MG/1
TABLET ORAL
Qty: 90 TABLET | Refills: 3 | Status: SHIPPED | OUTPATIENT
Start: 2022-11-01

## 2022-11-03 ENCOUNTER — OFFICE VISIT (OUTPATIENT)
Dept: CARDIOLOGY | Facility: CLINIC | Age: 82
End: 2022-11-03

## 2022-11-03 ENCOUNTER — HOSPITAL ENCOUNTER (OUTPATIENT)
Dept: CARDIOLOGY | Facility: HOSPITAL | Age: 82
Discharge: HOME | End: 2022-11-03
Admitting: INTERNAL MEDICINE

## 2022-11-03 VITALS
SYSTOLIC BLOOD PRESSURE: 120 MMHG | HEIGHT: 71 IN | BODY MASS INDEX: 27.16 KG/M2 | DIASTOLIC BLOOD PRESSURE: 68 MMHG | HEART RATE: 48 BPM | WEIGHT: 194 LBS

## 2022-11-03 VITALS — WEIGHT: 191 LBS | BODY MASS INDEX: 26.74 KG/M2 | HEIGHT: 71 IN | HEART RATE: 54 BPM

## 2022-11-03 DIAGNOSIS — I48.0 PAROXYSMAL ATRIAL FIBRILLATION: ICD-10-CM

## 2022-11-03 DIAGNOSIS — I48.0 PAROXYSMAL ATRIAL FIBRILLATION: Primary | ICD-10-CM

## 2022-11-03 DIAGNOSIS — I25.10 CORONARY ARTERY DISEASE INVOLVING NATIVE CORONARY ARTERY OF NATIVE HEART WITHOUT ANGINA PECTORIS: ICD-10-CM

## 2022-11-03 DIAGNOSIS — I10 PRIMARY HYPERTENSION: ICD-10-CM

## 2022-11-03 LAB
AORTIC ARCH: 2.5 CM
AORTIC DIMENSIONLESS INDEX: 0.8 (DI)
ASCENDING AORTA: 3.5 CM
BH CV ECHO MEAS - ACS: 2.24 CM
BH CV ECHO MEAS - AI P1/2T: 869.2 MSEC
BH CV ECHO MEAS - AO MAX PG: 7.8 MMHG
BH CV ECHO MEAS - AO MEAN PG: 4.2 MMHG
BH CV ECHO MEAS - AO ROOT DIAM: 3.7 CM
BH CV ECHO MEAS - AO V2 MAX: 140.1 CM/SEC
BH CV ECHO MEAS - AO V2 VTI: 32.1 CM
BH CV ECHO MEAS - AVA(I,D): 2.8 CM2
BH CV ECHO MEAS - EDV(CUBED): 93.5 ML
BH CV ECHO MEAS - EDV(MOD-SP2): 96 ML
BH CV ECHO MEAS - EDV(MOD-SP4): 116 ML
BH CV ECHO MEAS - EF(MOD-BP): 65.9 %
BH CV ECHO MEAS - EF(MOD-SP2): 68.8 %
BH CV ECHO MEAS - EF(MOD-SP4): 65.5 %
BH CV ECHO MEAS - ESV(CUBED): 23.5 ML
BH CV ECHO MEAS - ESV(MOD-SP2): 30 ML
BH CV ECHO MEAS - ESV(MOD-SP4): 40 ML
BH CV ECHO MEAS - FS: 36.9 %
BH CV ECHO MEAS - IVS/LVPW: 1.09 CM
BH CV ECHO MEAS - IVSD: 1.08 CM
BH CV ECHO MEAS - LAT PEAK E' VEL: 12.3 CM/SEC
BH CV ECHO MEAS - LV DIASTOLIC VOL/BSA (35-75): 56.1 CM2
BH CV ECHO MEAS - LV MASS(C)D: 164.2 GRAMS
BH CV ECHO MEAS - LV MAX PG: 5.1 MMHG
BH CV ECHO MEAS - LV MEAN PG: 2.7 MMHG
BH CV ECHO MEAS - LV SYSTOLIC VOL/BSA (12-30): 19.3 CM2
BH CV ECHO MEAS - LV V1 MAX: 112.7 CM/SEC
BH CV ECHO MEAS - LV V1 VTI: 25 CM
BH CV ECHO MEAS - LVIDD: 4.5 CM
BH CV ECHO MEAS - LVIDS: 2.9 CM
BH CV ECHO MEAS - LVOT AREA: 3.5 CM2
BH CV ECHO MEAS - LVOT DIAM: 2.13 CM
BH CV ECHO MEAS - LVPWD: 1 CM
BH CV ECHO MEAS - MED PEAK E' VEL: 7.3 CM/SEC
BH CV ECHO MEAS - MV A DUR: 0.19 SEC
BH CV ECHO MEAS - MV A MAX VEL: 56.6 CM/SEC
BH CV ECHO MEAS - MV DEC SLOPE: 326.9 CM/SEC2
BH CV ECHO MEAS - MV DEC TIME: 0.22 MSEC
BH CV ECHO MEAS - MV E MAX VEL: 81 CM/SEC
BH CV ECHO MEAS - MV E/A: 1.43
BH CV ECHO MEAS - MV MAX PG: 3.7 MMHG
BH CV ECHO MEAS - MV MEAN PG: 1.33 MMHG
BH CV ECHO MEAS - MV P1/2T: 87.6 MSEC
BH CV ECHO MEAS - MV V2 VTI: 41.6 CM
BH CV ECHO MEAS - MVA(P1/2T): 2.5 CM2
BH CV ECHO MEAS - MVA(VTI): 2.13 CM2
BH CV ECHO MEAS - PA ACC TIME: 0.14 SEC
BH CV ECHO MEAS - PA PR(ACCEL): 14.8 MMHG
BH CV ECHO MEAS - PA V2 MAX: 88.2 CM/SEC
BH CV ECHO MEAS - PI END-D VEL: 88.2 CM/SEC
BH CV ECHO MEAS - PULM A REVS DUR: 0.19 SEC
BH CV ECHO MEAS - PULM A REVS VEL: 27.5 CM/SEC
BH CV ECHO MEAS - PULM DIAS VEL: 28.4 CM/SEC
BH CV ECHO MEAS - PULM S/D: 1.42
BH CV ECHO MEAS - PULM SYS VEL: 40.3 CM/SEC
BH CV ECHO MEAS - QP/QS: 0.66
BH CV ECHO MEAS - RAP SYSTOLE: 3 MMHG
BH CV ECHO MEAS - RV MAX PG: 1.4 MMHG
BH CV ECHO MEAS - RV V1 MAX: 59.1 CM/SEC
BH CV ECHO MEAS - RV V1 VTI: 14.8 CM
BH CV ECHO MEAS - RVOT DIAM: 2.25 CM
BH CV ECHO MEAS - RVSP: 36.8 MMHG
BH CV ECHO MEAS - SI(MOD-SP2): 31.9 ML/M2
BH CV ECHO MEAS - SI(MOD-SP4): 36.7 ML/M2
BH CV ECHO MEAS - SUP REN AO DIAM: 2.3 CM
BH CV ECHO MEAS - SV(LVOT): 88.7 ML
BH CV ECHO MEAS - SV(MOD-SP2): 66 ML
BH CV ECHO MEAS - SV(MOD-SP4): 76 ML
BH CV ECHO MEAS - SV(RVOT): 58.7 ML
BH CV ECHO MEAS - TAPSE (>1.6): 2.3 CM
BH CV ECHO MEAS - TR MAX PG: 33.8 MMHG
BH CV ECHO MEAS - TR MAX VEL: 290.5 CM/SEC
BH CV ECHO MEASUREMENTS AVERAGE E/E' RATIO: 8.27
BH CV XLRA - RV BASE: 3.4 CM
BH CV XLRA - RV LENGTH: 6.6 CM
BH CV XLRA - RV MID: 3.2 CM
BH CV XLRA - TDI S': 18.1 CM/SEC
LEFT ATRIUM VOLUME INDEX: 26 ML/M2
MAXIMAL PREDICTED HEART RATE: 136 BPM
SINUS: 3.7 CM
STJ: 3.4 CM
STRESS TARGET HR: 116 BPM

## 2022-11-03 PROCEDURE — 25010000002 PERFLUTREN (DEFINITY) 8.476 MG IN SODIUM CHLORIDE (PF) 0.9 % 10 ML INJECTION: Performed by: INTERNAL MEDICINE

## 2022-11-03 PROCEDURE — 93306 TTE W/DOPPLER COMPLETE: CPT | Performed by: INTERNAL MEDICINE

## 2022-11-03 PROCEDURE — 99214 OFFICE O/P EST MOD 30 MIN: CPT | Performed by: NURSE PRACTITIONER

## 2022-11-03 PROCEDURE — 93000 ELECTROCARDIOGRAM COMPLETE: CPT | Performed by: NURSE PRACTITIONER

## 2022-11-03 PROCEDURE — 93306 TTE W/DOPPLER COMPLETE: CPT

## 2022-11-03 RX ADMIN — PERFLUTREN 2 ML: 6.52 INJECTION, SUSPENSION INTRAVENOUS at 13:16

## 2022-11-03 NOTE — PROGRESS NOTES
Date of Office Visit: 2022  Encounter Provider: Taylor Madison, HUANG, APRN  Place of Service: [facility]  Patient Name: Wilberto Madison  :1940        Subjective:     Chief Complaint:  Paroxysmal atrial fibrillation, coronary artery disease      History of Present Illness:  Wilberto Madison is a 82 y.o. male patient of Dr. Linn.  This patient is new to me and I have reviewed his records.    Patient has a history of coronary artery disease with MI and angioplasty in , paroxysmal atrial fibrillation, hypertension, hyperlipidemia, family history of heart disease in father and brother, family history of stroke in his mother.    Patient is a former pipe smoker who quit in .  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  He was seen by Dr. Linn 2021 at which point he was feeling well.  It was noted that his wife was in memory care at [facility] and he was residing in the form.  It was noted that he had had stress perfusion study in  without evidence of ischemia, considered low risk.  Follow-up echocardiogram was recommended in 1 year.  Echocardiogram today shows normal LV systolic function with EF of 66%, trace mitral regurgitation, mildly elevated RVSP of 37 mmHg      Patient presents to office today for follow-up appointment.  This is a pleasant patient who presents to the office today for routine follow-up visit.  Unfortunately his wife passed about 3 months ago.  He is still living at the [facility].  He has children and his daughter lives locally in [place].  He is feeling well since last visit.  He might get some slight fatigue with walking if he has missed a few days but as long as he walks regularly he does not feel any fatigue with activities.  He usually walks about 1.5 miles a day without any exertional symptoms or concerns.  Denies chest pain or discomfort, dyspnea, edema,  dizziness, lightheadedness, syncope, near syncope, falls, abnormal bleeding, or dark or tarry stool.  He reports he had 2 episodes of A. fib this past year a while back somewhat close together.  He noticed it before bed and by the time he woke up it was gone.  No symptoms and he believes heart rate was well controlled with it.  No recent issues.  He believes his heart rate at home has chronically been 48 to 50s, completely asymptomatic.         Past Medical History:   Diagnosis Date   • Arrhythmia    • Atrial fibrillation (HCC)    • Benign prostatic hyperplasia without lower urinary tract symptoms    • Coronary artery disease 1989   • ED (erectile dysfunction)    • H/O bone density study 12/2015    NLM   • Hyperlipidemia    • Hypertension    • Myocardial infarction (HCC) 1989    Was called “aborted “ attack   • Paroxysmal atrial fibrillation (HCC)      Past Surgical History:   Procedure Laterality Date   • ANGIOPLASTY     • BACK SURGERY     • BIOPSY PROSTATE NEEDLE / PUNCH / INCISIONAL  2000   • COLONOSCOPY  2014    NEG   • CORONARY ANGIOPLASTY     • LAMINECTOMY  2014    LUMBAR     Outpatient Medications Prior to Visit   Medication Sig Dispense Refill   • amLODIPine (NORVASC) 5 MG tablet TAKE 1 TABLET DAILY 90 tablet 3   • atorvastatin (LIPITOR) 10 MG tablet TAKE 1 TABLET DAILY 90 tablet 3   • Cholecalciferol (VITAMIN D3) 5000 units capsule capsule Take 5,000 Units by mouth Every Night.     • Cyanocobalamin (B-12) 1000 MCG capsule Take 1 capsule by mouth Daily.     • hydroCHLOROthiazide (HYDRODIURIL) 12.5 MG tablet TAKE 1 TABLET DAILY IN THE MORNING 90 tablet 3   • nitroglycerin (Nitrostat) 0.4 MG SL tablet Place 1 tablet under the tongue Every 5 (Five) Minutes As Needed for Chest Pain. Take no more than 3 doses in 15 minutes. 25 tablet 1   • sotalol (BETAPACE) 80 MG tablet TAKE 1 TABLET TWICE A  tablet 3   • tamsulosin (FLOMAX) 0.4 MG capsule 24 hr capsule TAKE 1 CAPSULE TWICE A  capsule 3   • Xarelto  "20 MG tablet TAKE 1 TABLET DAILY 90 tablet 3   • levothyroxine (SYNTHROID, LEVOTHROID) 100 MCG tablet Take 1 tablet by mouth Daily. 90 tablet 1   • sildenafil (REVATIO) 20 MG tablet 1 to 2 tablets 2 hours prior to activity 30 tablet 3     No facility-administered medications prior to visit.       Allergies as of 2022   • (No Known Allergies)     Social History     Socioeconomic History   • Marital status:    Tobacco Use   • Smoking status: Former     Types: Pipe     Quit date:      Years since quittin.8   • Smokeless tobacco: Never   • Tobacco comments:     Never inhaled. Occasional use at desk.   Vaping Use   • Vaping Use: Never used   Substance and Sexual Activity   • Alcohol use: Yes     Alcohol/week: 6.0 standard drinks     Types: 6 Glasses of wine per week     Comment: One glass per day. No increase over timr.  caffiene- denies use   • Drug use: No   • Sexual activity: Not Currently     Birth control/protection: Vasectomy     Comment: Wife  3 months ago.     Family History   Problem Relation Age of Onset   • Heart disease Father    • Heart attack Father    • Heart disease Brother         By pass surgery   • Stroke Mother        ROS       Objective:     Vitals:    22 1356   BP: 120/68   Pulse: (!) 48   Weight: 88 kg (194 lb)   Height: 180.3 cm (71\")     Body mass index is 27.06 kg/m².      PHYSICAL EXAM:  Physical Exam        ECG 12 Lead    Date/Time: 11/3/2022 2:16 PM  Performed by: Taylor Madison DNP, JENNIFER  Authorized by: Taylor Madison DNP, APRN   Comparison: compared with previous ECG from 11/3/2022  Rhythm: sinus bradycardia  BPM: 48  Comments: No significant changes from previous EKGs              Assessment:       Diagnosis Plan   1. Paroxysmal atrial fibrillation (HCC)        2. Primary hypertension        3. Coronary artery disease involving native coronary artery of native heart without angina pectoris              Plan:     1. Paroxysmal atrial fibrillation: " Anticoagulated with Xarelto.  Denies falls or abnormal bleeding.  On sotalol.  Last labs showed creatinine of 0.97, K+ of 3.7, EGFR of 72.  Has upcoming labs scheduled through PCP.  Heart rate a little bit on the low side in the office today, completely asymptomatic.  He believes his heart rate is always on the lower side.  He is going to monitor at home and call for heart rate below 50 or if he develops symptoms at higher readings.  He will also call if he has issues with A. fib episodes.  Overall he is pleased with his A. fib control.  2. Hypertension: Blood pressure well controlled in the office today.  Patient will monitor at home and call for high or low readings.  3. CAD: With history of angioplasty to LAD 1988.  Denies anginal symptoms.  Normal stress test without evidence of ischemia in 2019.  Discussed in detail signs/symptoms that would warrant sooner call or follow-up to the office or ER visit.  4. Pulmonary hypertension: RVSP mildly elevated on 2016 echo remains mildly elevated today.  Completely asymptomatic without dyspnea, or signs/symptoms of fluid overload.  Denies history of chronic lung disease and denies any sleep apnea symptoms.  Continue to monitor.  5. Dyslipidemia: On atorvastatin.  PCP managing.  6. Hypothyroidism: PCP managing.  On replacement.    Patient will follow-up with Dr. Linn in 6 months or follow-up sooner if needed for any new, recurrent, or worsening symptoms or concerns.  Discussed in detail signs/symptoms that warrant sooner call or follow-up to the office or ER visit.           Your medication list          Accurate as of November 3, 2022  2:40 PM. If you have any questions, ask your nurse or doctor.            CONTINUE taking these medications      Instructions Last Dose Given Next Dose Due   amLODIPine 5 MG tablet  Commonly known as: NORVASC      TAKE 1 TABLET DAILY       atorvastatin 10 MG tablet  Commonly known as: LIPITOR      TAKE 1 TABLET DAILY       B-12 1000 MCG  capsule      Take 1 capsule by mouth Daily.       hydroCHLOROthiazide 12.5 MG tablet  Commonly known as: HYDRODIURIL      TAKE 1 TABLET DAILY IN THE MORNING       levothyroxine 100 MCG tablet  Commonly known as: SYNTHROID, LEVOTHROID      Take 1 tablet by mouth Daily.       nitroglycerin 0.4 MG SL tablet  Commonly known as: Nitrostat      Place 1 tablet under the tongue Every 5 (Five) Minutes As Needed for Chest Pain. Take no more than 3 doses in 15 minutes.       sotalol 80 MG tablet  Commonly known as: BETAPACE      TAKE 1 TABLET TWICE A DAY       tamsulosin 0.4 MG capsule 24 hr capsule  Commonly known as: FLOMAX      TAKE 1 CAPSULE TWICE A DAY       vitamin D3 125 MCG (5000 UT) capsule capsule      Take 5,000 Units by mouth Every Night.       Xarelto 20 MG tablet  Generic drug: rivaroxaban      TAKE 1 TABLET DAILY              The above medication changes may not have been made by this provider.  Patient's medication list was updated to reflect medications they are currently taking including medication changes made by other providers.            Thanks,    Taylor Madison, DNP, APRN  11/03/2022         Dictated utilizing Dragon dictation

## 2022-11-04 ENCOUNTER — TELEPHONE (OUTPATIENT)
Dept: CARDIOLOGY | Facility: CLINIC | Age: 82
End: 2022-11-04

## 2022-11-04 NOTE — TELEPHONE ENCOUNTER
Spoke with pt, he verbalized understanding        ----- Message from JENNIFER Pickard sent at 11/3/2022  3:37 PM EDT -----  Please let him know that echocardiogram is normal.    Thanks!  JENNIFER Link

## 2022-11-15 RX ORDER — SOTALOL HYDROCHLORIDE 80 MG/1
TABLET ORAL
Qty: 180 TABLET | Refills: 3 | Status: SHIPPED | OUTPATIENT
Start: 2022-11-15

## 2022-11-23 ENCOUNTER — TELEPHONE (OUTPATIENT)
Dept: INTERNAL MEDICINE | Facility: CLINIC | Age: 82
End: 2022-11-23

## 2022-11-23 NOTE — TELEPHONE ENCOUNTER
Caller: Wilberto Madison    Relationship to patient: Self    Best call back number: [phone]    Date of positive COVID19 test: 11/22/22    COVID19 symptoms: SORE THROAT, CONGESTION, RUNNY NOSE     Additional information or concerns: PATIENT WOULD LIKE TO KNOW WHAT DR LANDRUM RECOMMENDS HIM DO OR TAKE      PLEASE ADVISE     What is the patients preferred pharmacy: [facility] [id number] [facility] [address] [facility] [phone] [facility] [phone] FX

## 2022-11-30 RX ORDER — RIVAROXABAN 20 MG/1
TABLET, FILM COATED ORAL
Qty: 90 TABLET | Refills: 3 | Status: SHIPPED | OUTPATIENT
Start: 2022-11-30

## 2023-01-20 ENCOUNTER — OFFICE VISIT (OUTPATIENT)
Dept: INTERNAL MEDICINE | Facility: CLINIC | Age: 83
End: 2023-01-20
Payer: MEDICARE

## 2023-01-20 VITALS
BODY MASS INDEX: 27.44 KG/M2 | WEIGHT: 196 LBS | OXYGEN SATURATION: 98 % | HEART RATE: 53 BPM | SYSTOLIC BLOOD PRESSURE: 118 MMHG | HEIGHT: 71 IN | TEMPERATURE: 96.8 F | DIASTOLIC BLOOD PRESSURE: 60 MMHG | RESPIRATION RATE: 16 BRPM

## 2023-01-20 DIAGNOSIS — Z00.00 ROUTINE GENERAL MEDICAL EXAMINATION AT A HEALTH CARE FACILITY: Primary | ICD-10-CM

## 2023-01-20 DIAGNOSIS — E78.2 MIXED HYPERLIPIDEMIA: ICD-10-CM

## 2023-01-20 DIAGNOSIS — Z12.5 SPECIAL SCREENING FOR MALIGNANT NEOPLASM OF PROSTATE: ICD-10-CM

## 2023-01-20 DIAGNOSIS — I10 PRIMARY HYPERTENSION: ICD-10-CM

## 2023-01-20 DIAGNOSIS — I48.0 PAROXYSMAL ATRIAL FIBRILLATION: ICD-10-CM

## 2023-01-20 PROCEDURE — G0439 PPPS, SUBSEQ VISIT: HCPCS | Performed by: INTERNAL MEDICINE

## 2023-01-20 PROCEDURE — G0009 ADMIN PNEUMOCOCCAL VACCINE: HCPCS | Performed by: INTERNAL MEDICINE

## 2023-01-20 PROCEDURE — 1170F FXNL STATUS ASSESSED: CPT | Performed by: INTERNAL MEDICINE

## 2023-01-20 PROCEDURE — 90677 PCV20 VACCINE IM: CPT | Performed by: INTERNAL MEDICINE

## 2023-01-20 PROCEDURE — 1159F MED LIST DOCD IN RCRD: CPT | Performed by: INTERNAL MEDICINE

## 2023-01-20 RX ORDER — CYCLOSPORINE 0.5 MG/ML
EMULSION OPHTHALMIC
COMMUNITY
Start: 2022-12-07

## 2023-01-20 NOTE — PROGRESS NOTES
The ABCs of the Annual Wellness Visit  Subsequent Medicare Wellness Visit    Subjective      Wilberto Madison is a 82 y.o. male who presents for a Subsequent Medicare Wellness Visit.    The following portions of the patient's history were reviewed and   updated as appropriate: allergies, current medications, past family history, past medical history, past social history, past surgical history and problem list.    Compared to one year ago, the patient feels his physical   health is the same.    Compared to one year ago, the patient feels his mental   health is the same.    Recent Hospitalizations:  He was not admitted to the hospital during the last year.       Current Medical Providers:  Patient Care Team:  Larry Schofield MD (Tony) as PCP - General (Internal Medicine)    Outpatient Medications Prior to Visit   Medication Sig Dispense Refill   • amLODIPine (NORVASC) 5 MG tablet TAKE 1 TABLET DAILY 90 tablet 3   • atorvastatin (LIPITOR) 10 MG tablet TAKE 1 TABLET DAILY 90 tablet 3   • Cholecalciferol (VITAMIN D3) 5000 units capsule capsule Take 5,000 Units by mouth Every Night.     • Cyanocobalamin (B-12) 1000 MCG capsule Take 1 capsule by mouth Daily.     • hydroCHLOROthiazide (HYDRODIURIL) 12.5 MG tablet TAKE 1 TABLET DAILY IN THE MORNING 90 tablet 3   • levothyroxine (SYNTHROID, LEVOTHROID) 100 MCG tablet Take 1 tablet by mouth Daily. 90 tablet 1   • nitroglycerin (Nitrostat) 0.4 MG SL tablet Place 1 tablet under the tongue Every 5 (Five) Minutes As Needed for Chest Pain. Take no more than 3 doses in 15 minutes. 25 tablet 1   • Restasis 0.05 % ophthalmic emulsion      • sotalol (BETAPACE) 80 MG tablet TAKE 1 TABLET TWICE A  tablet 3   • tamsulosin (FLOMAX) 0.4 MG capsule 24 hr capsule TAKE 1 CAPSULE TWICE A  capsule 3   • Xarelto 20 MG tablet TAKE 1 TABLET DAILY 90 tablet 3     No facility-administered medications prior to visit.       No opioid medication identified on active medication list. I  "have reviewed chart for other potential  high risk medication/s and harmful drug interactions in the elderly.          Aspirin is not on active medication list.  Aspirin use is not indicated based on review of current medical condition/s. Risk of harm outweighs potential benefits.  .    Patient Active Problem List   Diagnosis   • Paroxysmal atrial fibrillation (HCC)   • Hyperlipidemia   • Hypertension   • Benign prostatic hyperplasia without lower urinary tract symptoms   • Coronary artery disease involving native coronary artery of native heart without angina pectoris     Advance Care Planning  Advance Directive is on file.  ACP discussion was held with the patient during this visit. Patient has an advance directive in EMR which is still valid.      Objective    Vitals:    23 1048   BP: 118/60   BP Location: Left arm   Patient Position: Sitting   Cuff Size: Large Adult   Pulse: 53   Resp: 16   Temp: 96.8 °F (36 °C)   TempSrc: Temporal   SpO2: 98%   Weight: 88.9 kg (196 lb)   Height: 180.3 cm (71\")     Estimated body mass index is 27.34 kg/m² as calculated from the following:    Height as of this encounter: 180.3 cm (71\").    Weight as of this encounter: 88.9 kg (196 lb).    BMI is >= 25 and <30. (Overweight) The following options were offered after discussion;: weight loss educational material (shared in after visit summary)      Does the patient have evidence of cognitive impairment?   No            HEALTH RISK ASSESSMENT    Smoking Status:  Social History     Tobacco Use   Smoking Status Former   • Types: Pipe   • Quit date:    • Years since quittin.0   Smokeless Tobacco Never   Tobacco Comments    Never inhaled. Occasional use at desk.     Alcohol Consumption:  Social History     Substance and Sexual Activity   Alcohol Use Yes   • Alcohol/week: 6.0 standard drinks   • Types: 6 Glasses of wine per week    Comment: One glass per day. No increase over timr.  caffiene- denies use     Fall Risk " Screen:    ERIC Fall Risk Assessment was completed, and patient is at LOW risk for falls.Assessment completed on:1/20/2023    Depression Screening:  PHQ-2/PHQ-9 Depression Screening 1/20/2023   Little Interest or Pleasure in Doing Things 0-->not at all   Feeling Down, Depressed or Hopeless 0-->not at all   PHQ-9: Brief Depression Severity Measure Score 0       Health Habits and Functional and Cognitive Screening:  Functional & Cognitive Status 1/20/2023   Do you have difficulty preparing food and eating? No   Do you have difficulty bathing yourself, getting dressed or grooming yourself? No   Do you have difficulty using the toilet? No   Do you have difficulty moving around from place to place? No   Do you have trouble with steps or getting out of a bed or a chair? No   Current Diet Well Balanced Diet   Dental Exam Up to date   Eye Exam Up to date   Exercise (times per week) 4 times per week   Current Exercises Include Walking   Do you need help using the phone?  No   Are you deaf or do you have serious difficulty hearing?  No   Do you need help with transportation? No   Do you need help shopping? No   Do you need help preparing meals?  No   Do you need help with housework?  No   Do you need help with laundry? No   Do you need help taking your medications? No   Do you need help managing money? No   Do you ever drive or ride in a car without wearing a seat belt? No   Have you felt unusual stress, anger or loneliness in the last month? No   Who do you live with? Alone   If you need help, do you have trouble finding someone available to you? No   Have you been bothered in the last four weeks by sexual problems? No   Do you have difficulty concentrating, remembering or making decisions? No       Age-appropriate Screening Schedule:  Refer to the list below for future screening recommendations based on patient's age, sex and/or medical conditions. Orders for these recommended tests are listed in the plan section. The  patient has been provided with a written plan.    Health Maintenance   Topic Date Due   • ZOSTER VACCINE (2 of 2) 04/15/2015   • LIPID PANEL  12/07/2022   • TDAP/TD VACCINES (2 - Td or Tdap) 04/19/2025   • INFLUENZA VACCINE  Completed                CMS Preventative Services Quick Reference  Risk Factors Identified During Encounter:    Immunizations Discussed/Encouraged: Prevnar 20 (Pneumococcal 20-valent conjugate)    The above risks/problems have been discussed with the patient.  Pertinent information has been shared with the patient in the After Visit Summary.    Diagnoses and all orders for this visit:    1. Routine general medical examination at a health care facility (Primary)    2. Paroxysmal atrial fibrillation (HCC)  -     Urinalysis With Microscopic - Urine, Clean Catch  -     CBC & Differential  -     Comprehensive Metabolic Panel  -     Lipid Panel With / Chol / HDL Ratio  -     TSH  -     PSA Screen    3. Mixed hyperlipidemia  -     Urinalysis With Microscopic - Urine, Clean Catch  -     CBC & Differential  -     Comprehensive Metabolic Panel  -     Lipid Panel With / Chol / HDL Ratio  -     TSH  -     PSA Screen    4. Primary hypertension  -     Urinalysis With Microscopic - Urine, Clean Catch  -     CBC & Differential  -     Comprehensive Metabolic Panel  -     Lipid Panel With / Chol / HDL Ratio  -     TSH  -     PSA Screen    5. Special screening for malignant neoplasm of prostate  -     Urinalysis With Microscopic - Urine, Clean Catch  -     CBC & Differential  -     Comprehensive Metabolic Panel  -     Lipid Panel With / Chol / HDL Ratio  -     TSH  -     PSA Screen    Other orders  -     Pneumococcal Conjugate Vaccine 20-Valent (PCV20)    Overall he is doing well.  Unfortunate his wife passed away from dementia back in July and he is doing pretty well.  He is done some grief counseling and plans to do some more.  Medical problems stable.  He is little concerned about his weight gain but he is  going to work on his diet and activity more.  Bilateral ptosis, he is talked to the ophthalmologist and there looking at scheduling his surgery in the near future for that.  Talked about the shingles vaccine at the pharmacy.  Prevnar given today.  Recheck 6 months or sooner if any problems    Follow Up:   Next Medicare Wellness visit to be scheduled in 1 year.      An After Visit Summary and PPPS were made available to the patient.

## 2023-01-21 LAB
ALBUMIN SERPL-MCNC: 3.9 G/DL (ref 3.5–5.2)
ALBUMIN/GLOB SERPL: 1.5 G/DL
ALP SERPL-CCNC: 69 U/L (ref 39–117)
ALT SERPL-CCNC: 20 U/L (ref 1–41)
APPEARANCE UR: CLEAR
AST SERPL-CCNC: 20 U/L (ref 1–40)
BACTERIA #/AREA URNS HPF: NORMAL /HPF
BASOPHILS # BLD AUTO: 0.05 10*3/MM3 (ref 0–0.2)
BASOPHILS NFR BLD AUTO: 0.8 % (ref 0–1.5)
BILIRUB SERPL-MCNC: 0.8 MG/DL (ref 0–1.2)
BILIRUB UR QL STRIP: NEGATIVE
BUN SERPL-MCNC: 11 MG/DL (ref 8–23)
BUN/CREAT SERPL: 11.6 (ref 7–25)
CALCIUM SERPL-MCNC: 9 MG/DL (ref 8.6–10.5)
CASTS URNS MICRO: NORMAL
CHLORIDE SERPL-SCNC: 98 MMOL/L (ref 98–107)
CHOLEST SERPL-MCNC: 107 MG/DL (ref 0–200)
CHOLEST/HDLC SERPL: 2.74 {RATIO}
CO2 SERPL-SCNC: 30 MMOL/L (ref 22–29)
COLOR UR: YELLOW
CREAT SERPL-MCNC: 0.95 MG/DL (ref 0.76–1.27)
EGFRCR SERPLBLD CKD-EPI 2021: 78.9 ML/MIN/1.73
EOSINOPHIL # BLD AUTO: 0.14 10*3/MM3 (ref 0–0.4)
EOSINOPHIL NFR BLD AUTO: 2.3 % (ref 0.3–6.2)
EPI CELLS #/AREA URNS HPF: NORMAL /HPF
ERYTHROCYTE [DISTWIDTH] IN BLOOD BY AUTOMATED COUNT: 12.2 % (ref 12.3–15.4)
GLOBULIN SER CALC-MCNC: 2.6 GM/DL
GLUCOSE SERPL-MCNC: 91 MG/DL (ref 65–99)
GLUCOSE UR QL STRIP: NEGATIVE
HCT VFR BLD AUTO: 42.3 % (ref 37.5–51)
HDLC SERPL-MCNC: 39 MG/DL (ref 40–60)
HGB BLD-MCNC: 14.7 G/DL (ref 13–17.7)
HGB UR QL STRIP: NEGATIVE
IMM GRANULOCYTES # BLD AUTO: 0.02 10*3/MM3 (ref 0–0.05)
IMM GRANULOCYTES NFR BLD AUTO: 0.3 % (ref 0–0.5)
KETONES UR QL STRIP: NEGATIVE
LDLC SERPL CALC-MCNC: 53 MG/DL (ref 0–100)
LEUKOCYTE ESTERASE UR QL STRIP: NEGATIVE
LYMPHOCYTES # BLD AUTO: 0.87 10*3/MM3 (ref 0.7–3.1)
LYMPHOCYTES NFR BLD AUTO: 14 % (ref 19.6–45.3)
MCH RBC QN AUTO: 32 PG (ref 26.6–33)
MCHC RBC AUTO-ENTMCNC: 34.8 G/DL (ref 31.5–35.7)
MCV RBC AUTO: 92.2 FL (ref 79–97)
MONOCYTES # BLD AUTO: 0.71 10*3/MM3 (ref 0.1–0.9)
MONOCYTES NFR BLD AUTO: 11.4 % (ref 5–12)
NEUTROPHILS # BLD AUTO: 4.42 10*3/MM3 (ref 1.7–7)
NEUTROPHILS NFR BLD AUTO: 71.2 % (ref 42.7–76)
NITRITE UR QL STRIP: NEGATIVE
NRBC BLD AUTO-RTO: 0 /100 WBC (ref 0–0.2)
PH UR STRIP: 7 [PH] (ref 5–8)
PLATELET # BLD AUTO: 179 10*3/MM3 (ref 140–450)
POTASSIUM SERPL-SCNC: 4.1 MMOL/L (ref 3.5–5.2)
PROT SERPL-MCNC: 6.5 G/DL (ref 6–8.5)
PROT UR QL STRIP: NEGATIVE
RBC # BLD AUTO: 4.59 10*6/MM3 (ref 4.14–5.8)
RBC #/AREA URNS HPF: NORMAL /HPF
SODIUM SERPL-SCNC: 136 MMOL/L (ref 136–145)
SP GR UR STRIP: 1.01 (ref 1–1.03)
TRIGL SERPL-MCNC: 68 MG/DL (ref 0–150)
TSH SERPL DL<=0.005 MIU/L-ACNC: 0.57 UIU/ML (ref 0.27–4.2)
UROBILINOGEN UR STRIP-MCNC: NORMAL MG/DL
VLDLC SERPL CALC-MCNC: 15 MG/DL (ref 5–40)
WBC # BLD AUTO: 6.21 10*3/MM3 (ref 3.4–10.8)
WBC #/AREA URNS HPF: NORMAL /HPF

## 2023-02-13 RX ORDER — TAMSULOSIN HYDROCHLORIDE 0.4 MG/1
CAPSULE ORAL
Qty: 180 CAPSULE | Refills: 3 | Status: SHIPPED | OUTPATIENT
Start: 2023-02-13

## 2023-02-17 RX ORDER — LEVOTHYROXINE SODIUM 0.1 MG/1
TABLET ORAL
Qty: 90 TABLET | Refills: 3 | Status: SHIPPED | OUTPATIENT
Start: 2023-02-17

## 2023-02-17 NOTE — TELEPHONE ENCOUNTER
Rx Refill Note  Requested Prescriptions     Pending Prescriptions Disp Refills   • levothyroxine (SYNTHROID, LEVOTHROID) 100 MCG tablet [Pharmacy Med Name: L-THYROXINE (SYNTHROID) TABS 100MCG] 90 tablet 3     Sig: TAKE 1 TABLET DAILY      Last office visit with prescribing clinician: 1/20/2023   Last telemedicine visit with prescribing clinician: 7/21/2023   Next office visit with prescribing clinician: 7/21/2023                         Would you like a call back once the refill request has been completed: [] Yes [] No    If the office needs to give you a call back, can they leave a voicemail: [] Yes [] No    Keysha Davies MA  02/17/23, 08:00 EST

## 2023-03-17 ENCOUNTER — TELEPHONE (OUTPATIENT)
Dept: INTERNAL MEDICINE | Facility: CLINIC | Age: 83
End: 2023-03-17
Payer: MEDICARE

## 2023-03-17 NOTE — TELEPHONE ENCOUNTER
Caller: LYNN    Relationship: NURSE WITH [employer]    Best call back number:[phone]    What was the call regarding: LYNN WITH [employer] STATES SHE DID AN ANNUAL WELLNESS EXAM ON THE PATIENT 3/15/23 AS WELL AS A CIRCULATION SCREENING WHICH SHOWED THAT THE PATIENT HAD MODERATE PERIPHERAL ARTERIAL DISEASE WITH NO SYMPTOMS AND GOOD PULSE.

## 2023-03-21 RX ORDER — HYDROCHLOROTHIAZIDE 12.5 MG/1
TABLET ORAL
Qty: 90 TABLET | Refills: 3 | Status: SHIPPED | OUTPATIENT
Start: 2023-03-21

## 2023-03-28 ENCOUNTER — TELEPHONE (OUTPATIENT)
Dept: INTERNAL MEDICINE | Facility: CLINIC | Age: 83
End: 2023-03-28

## 2023-03-28 NOTE — TELEPHONE ENCOUNTER
"    Caller: Wilberto Madison \"Del\"    Relationship to patient: Self    Best call back number: [phone]    Patient is needing: PATIENT STATES THAT HE HAS A SKIN ISSUE AND WOULD LIKE TO KNOW IF HE NEEDS TO MAKE AN APPOINTMENT. PLEASE REACH OUT AND ADVISE.       "

## 2023-05-03 ENCOUNTER — OFFICE VISIT (OUTPATIENT)
Dept: CARDIOLOGY | Facility: CLINIC | Age: 83
End: 2023-05-03
Payer: MEDICARE

## 2023-05-03 VITALS
HEIGHT: 71 IN | SYSTOLIC BLOOD PRESSURE: 110 MMHG | BODY MASS INDEX: 26.74 KG/M2 | DIASTOLIC BLOOD PRESSURE: 62 MMHG | HEART RATE: 59 BPM | WEIGHT: 191 LBS

## 2023-05-03 DIAGNOSIS — I10 PRIMARY HYPERTENSION: ICD-10-CM

## 2023-05-03 DIAGNOSIS — I25.10 CORONARY ARTERY DISEASE INVOLVING NATIVE CORONARY ARTERY OF NATIVE HEART WITHOUT ANGINA PECTORIS: Primary | ICD-10-CM

## 2023-05-03 DIAGNOSIS — E78.2 MIXED HYPERLIPIDEMIA: ICD-10-CM

## 2023-05-03 DIAGNOSIS — I48.0 PAROXYSMAL ATRIAL FIBRILLATION: ICD-10-CM

## 2023-05-03 PROCEDURE — 3078F DIAST BP <80 MM HG: CPT | Performed by: INTERNAL MEDICINE

## 2023-05-03 PROCEDURE — 3074F SYST BP LT 130 MM HG: CPT | Performed by: INTERNAL MEDICINE

## 2023-05-03 PROCEDURE — 1160F RVW MEDS BY RX/DR IN RCRD: CPT | Performed by: INTERNAL MEDICINE

## 2023-05-03 PROCEDURE — 93000 ELECTROCARDIOGRAM COMPLETE: CPT | Performed by: INTERNAL MEDICINE

## 2023-05-03 PROCEDURE — 1159F MED LIST DOCD IN RCRD: CPT | Performed by: INTERNAL MEDICINE

## 2023-05-03 PROCEDURE — 99214 OFFICE O/P EST MOD 30 MIN: CPT | Performed by: INTERNAL MEDICINE

## 2023-05-03 NOTE — PROGRESS NOTES
Date of Office Visit: 2023  Encounter Provider: Any Linn MD  Place of Service: [facility] CARDIOLOGY  Patient Name: Wilberto Madison  :1940      Patient ID:  Wilberto Madison is a 82 y.o. male is here for  followup for myocardial infarction, angioplasty, PAF.        History of Present Illness    He has a history of a myocardial infarction with angioplasty done in , paroxysmal atrial fibrillation, hypertension, hyperlipidemia.     His father and brother both had heart disease and his mother strokes.     He is , has 3 children, is retired , has a couple cups of coffee per day and 1 glass of wine daily.  He smoked a pipe but quit .     He had a nonischemic stress nuclear perfusion study done 10/17/2019.     His wife is living in a memory care facility at [facility] and he resides at the [facility].  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     Labs on 2023 show normal CMP, normal TSH, controlled lipids and normal CBC.  He had an echocardiogram done 11/3/2022 showing ejection fraction of 66% with normal diastolic function.    He has no chest pain or pressure.  He has no difficulty breathing.  He does not feel his heart racing or skipping.  He is walking for exercise.  He has no orthopnea or PND.  He is taking his medications as directed without difficulty and feels well.    Past Medical History:   Diagnosis Date   • Arrhythmia    • Atrial fibrillation    • Benign prostatic hyperplasia without lower urinary tract symptoms    • Coronary artery disease    • ED (erectile dysfunction)    • H/O bone density study 2015    [facility]   • Hyperlipidemia    • Hypertension    • Myocardial infarction     Was called “aborted “ attack   • Paroxysmal atrial fibrillation          Past Surgical History:   Procedure Laterality Date   •  ANGIOPLASTY     • BACK SURGERY     • BIOPSY PROSTATE NEEDLE / PUNCH / INCISIONAL     • COLONOSCOPY      NEG   • CORONARY ANGIOPLASTY     • LAMINECTOMY      LUMBAR       Current Outpatient Medications on File Prior to Visit   Medication Sig Dispense Refill   • amLODIPine (NORVASC) 5 MG tablet TAKE 1 TABLET DAILY 90 tablet 3   • atorvastatin (LIPITOR) 10 MG tablet TAKE 1 TABLET DAILY 90 tablet 3   • Cholecalciferol (VITAMIN D3) 5000 units capsule capsule Take 1 capsule by mouth Every Night.     • Cyanocobalamin (B-12) 1000 MCG capsule Take 1 capsule by mouth Daily.     • hydroCHLOROthiazide (HYDRODIURIL) 12.5 MG tablet TAKE 1 TABLET DAILY IN THE MORNING 90 tablet 3   • levothyroxine (SYNTHROID, LEVOTHROID) 100 MCG tablet TAKE 1 TABLET DAILY 90 tablet 3   • nitroglycerin (Nitrostat) 0.4 MG SL tablet Place 1 tablet under the tongue Every 5 (Five) Minutes As Needed for Chest Pain. Take no more than 3 doses in 15 minutes. 25 tablet 1   • Restasis 0.05 % ophthalmic emulsion      • sotalol (BETAPACE) 80 MG tablet TAKE 1 TABLET TWICE A  tablet 3   • tamsulosin (FLOMAX) 0.4 MG capsule 24 hr capsule TAKE 1 CAPSULE TWICE A  capsule 3   • Xarelto 20 MG tablet TAKE 1 TABLET DAILY 90 tablet 3     No current facility-administered medications on file prior to visit.       Social History     Socioeconomic History   • Marital status:    Tobacco Use   • Smoking status: Former     Types: Pipe     Quit date:      Years since quittin.3     Passive exposure: Past   • Smokeless tobacco: Never   • Tobacco comments:     Never inhaled. Occasional use at desk.   Vaping Use   • Vaping Use: Never used   Substance and Sexual Activity   • Alcohol use: Yes     Alcohol/week: 6.0 standard drinks     Types: 6 Glasses of wine per week     Comment: One glass per day. No increase over timr.  caffiene- denies use   • Drug use: No   • Sexual activity: Not Currently     Birth control/protection: Vasectomy      "Comment: Wife  3 months ago.           ROS    Procedures    ECG 12 Lead    Date/Time: 5/3/2023 1:00 PM  Performed by: Any Linn MD  Authorized by: Any Linn MD   Comparison: compared with previous ECG   Similar to previous ECG  Rhythm: sinus rhythm    Clinical impression: normal ECG                Objective:      Vitals:    23 1248   BP: 110/62   Pulse: 59   Weight: 86.6 kg (191 lb)   Height: 180.3 cm (71\")     Body mass index is 26.64 kg/m².    Vitals reviewed.   Constitutional:       General: Not in acute distress.     Appearance: Well-developed. Not diaphoretic.   Eyes:      General: No scleral icterus.     Conjunctiva/sclera: Conjunctivae normal.   HENT:      Head: Normocephalic and atraumatic.   Neck:      Thyroid: No thyromegaly.      Vascular: No carotid bruit or JVD.      Lymphadenopathy: No cervical adenopathy.   Pulmonary:      Effort: Pulmonary effort is normal. No respiratory distress.      Breath sounds: Normal breath sounds. No wheezing. No rhonchi. No rales.   Chest:      Chest wall: Not tender to palpatation.   Cardiovascular:      Normal rate. Regular rhythm.      Murmurs: There is no murmur.      No gallop.   Pulses:     Intact distal pulses.   Edema:     Peripheral edema absent.   Abdominal:      General: Bowel sounds are normal. There is no distension or abdominal bruit.      Palpations: Abdomen is soft. There is no abdominal mass.      Tenderness: There is no abdominal tenderness.   Musculoskeletal:         General: No deformity.      Extremities: No clubbing present.     Cervical back: Neck supple. Skin:     General: Skin is warm and dry. There is no cyanosis.      Coloration: Skin is not pale.      Findings: No rash.   Neurological:      Mental Status: Alert and oriented to person, place, and time.      Cranial Nerves: No cranial nerve deficit.   Psychiatric:         Judgment: Judgment normal.         Lab Review:       Assessment:      Diagnosis Plan   1. " Coronary artery disease involving native coronary artery of native heart without angina pectoris        2. Primary hypertension        3. Mixed hyperlipidemia        4. Paroxysmal atrial fibrillation          1. CAD with history of angioplasty to LAD in 1988, no angina.  2. Hypertension, goal less than 120/80, maintain his current medications including amlodipine 5 mg daily and hydrochlorothiazide 12.5 mg daily.  3. PAF, on sotalol and Xarelto.  4. Hyperlipidemia, on atorvastatin.     Plan:       See Oma in 1 year, no medication changes, consider repeating stress nuclear perfusion study next year.  Overall doing well, advised continued exercise.

## 2023-08-14 RX ORDER — ATORVASTATIN CALCIUM 10 MG/1
TABLET, FILM COATED ORAL
Qty: 90 TABLET | Refills: 3 | Status: SHIPPED | OUTPATIENT
Start: 2023-08-14

## 2023-10-24 RX ORDER — AMLODIPINE BESYLATE 5 MG/1
TABLET ORAL
Qty: 90 TABLET | Refills: 3 | Status: SHIPPED | OUTPATIENT
Start: 2023-10-24

## 2023-11-10 RX ORDER — SOTALOL HYDROCHLORIDE 80 MG/1
TABLET ORAL
Qty: 180 TABLET | Refills: 3 | Status: SHIPPED | OUTPATIENT
Start: 2023-11-10

## 2023-11-27 RX ORDER — RIVAROXABAN 20 MG/1
TABLET, FILM COATED ORAL
Qty: 90 TABLET | Refills: 3 | Status: SHIPPED | OUTPATIENT
Start: 2023-11-27

## 2024-02-08 RX ORDER — TAMSULOSIN HYDROCHLORIDE 0.4 MG/1
CAPSULE ORAL
Qty: 180 CAPSULE | Refills: 3 | Status: SHIPPED | OUTPATIENT
Start: 2024-02-08

## 2024-02-12 RX ORDER — LEVOTHYROXINE SODIUM 0.1 MG/1
TABLET ORAL
Qty: 90 TABLET | Refills: 3 | Status: SHIPPED | OUTPATIENT
Start: 2024-02-12

## 2024-02-15 ENCOUNTER — LAB (OUTPATIENT)
Dept: LAB | Facility: HOSPITAL | Age: 84
End: 2024-02-15
Payer: MEDICARE

## 2024-02-15 ENCOUNTER — HOSPITAL ENCOUNTER (OUTPATIENT)
Dept: CARDIOLOGY | Facility: HOSPITAL | Age: 84
Discharge: HOME | End: 2024-02-15
Payer: MEDICARE

## 2024-02-15 ENCOUNTER — TRANSCRIBE ORDERS (OUTPATIENT)
Dept: ADMINISTRATIVE | Facility: HOSPITAL | Age: 84
End: 2024-02-15
Payer: MEDICARE

## 2024-02-15 DIAGNOSIS — H02.882 MEIBOMIAN GLAND DYSFUNCTION (MGD) OF RIGHT LOWER EYELID: ICD-10-CM

## 2024-02-15 DIAGNOSIS — H02.413 MECHANICAL PTOSIS OF EYELID OF BOTH EYES: ICD-10-CM

## 2024-02-15 DIAGNOSIS — H04.123 DRY EYES: ICD-10-CM

## 2024-02-15 DIAGNOSIS — H02.881 MEIBOMIAN GLAND DYSFUNCTION (MGD) OF RIGHT UPPER EYELID: ICD-10-CM

## 2024-02-15 DIAGNOSIS — H02.885 MEIBOMIAN GLAND DYSFUNCTION (MGD) OF LEFT LOWER EYELID: ICD-10-CM

## 2024-02-15 DIAGNOSIS — Z41.1 ENCOUNTER FOR COSMETIC SURGERY: ICD-10-CM

## 2024-02-15 DIAGNOSIS — H02.831 DERMATOCHALASIS OF RIGHT UPPER EYELID: ICD-10-CM

## 2024-02-15 DIAGNOSIS — H02.884 MEIBOMIAN GLAND DYSFUNCTION (MGD) OF LEFT UPPER EYELID: ICD-10-CM

## 2024-02-15 DIAGNOSIS — H02.413 MECHANICAL PTOSIS OF EYELID OF BOTH EYES: Primary | ICD-10-CM

## 2024-02-15 LAB
ANION GAP SERPL CALCULATED.3IONS-SCNC: 11.1 MMOL/L (ref 5–15)
BUN SERPL-MCNC: 13 MG/DL (ref 8–23)
BUN/CREAT SERPL: 12.1 (ref 7–25)
CALCIUM SPEC-SCNC: 8.9 MG/DL (ref 8.6–10.5)
CHLORIDE SERPL-SCNC: 96 MMOL/L (ref 98–107)
CO2 SERPL-SCNC: 24.9 MMOL/L (ref 22–29)
CREAT SERPL-MCNC: 1.07 MG/DL (ref 0.76–1.27)
EGFRCR SERPLBLD CKD-EPI 2021: 68 ML/MIN/1.73
GLUCOSE SERPL-MCNC: 86 MG/DL (ref 65–99)
POTASSIUM SERPL-SCNC: 3.9 MMOL/L (ref 3.5–5.2)
QT INTERVAL: 382 MS
QTC INTERVAL: 436 MS
SODIUM SERPL-SCNC: 132 MMOL/L (ref 136–145)

## 2024-02-15 PROCEDURE — 36415 COLL VENOUS BLD VENIPUNCTURE: CPT

## 2024-02-15 PROCEDURE — 93005 ELECTROCARDIOGRAM TRACING: CPT | Performed by: OPHTHALMOLOGY

## 2024-02-15 PROCEDURE — 80048 BASIC METABOLIC PNL TOTAL CA: CPT

## 2024-02-22 ENCOUNTER — TELEPHONE (OUTPATIENT)
Dept: INTERNAL MEDICINE | Facility: CLINIC | Age: 84
End: 2024-02-22
Payer: MEDICARE

## 2024-02-22 NOTE — TELEPHONE ENCOUNTER
I will let him know he can stop the xarelto, I will inform Joseline as well. I also looked at your schedule to see about scheduling an office visit. And your next available appointment is on the day of his surgery. What would you like to do?

## 2024-02-22 NOTE — TELEPHONE ENCOUNTER
Joseline with [facility] called to verify that we received a clearance form for Wilberto as he must stop his Xarelto 20 mg TODAY, 02/22/2024, for his surgery on 02/27/2024. I confirmed that all I saw in his chart was an outside labs and radiology requisition document. I requested that Joseline re-fax the form ASAP. I informed her that Dr. Schofield is not in the office on Thursdays. She stated that she would accept a verbal response. I can confirm that I have received the form as well.

## 2024-02-23 NOTE — TELEPHONE ENCOUNTER
Adis FISHER    Relationship: PATIENT      Best Callback Number:       HUB PROVIDED THE RELAY MESSAGE FROM THE OFFICE      PATIENT: HAS FURTHER QUESTIONS AND WOULD LIKE A CALL BACK AT THE FOLLOWING PHONE NUMBER     ADDITIONAL INFORMATION:

## 2024-02-23 NOTE — TELEPHONE ENCOUNTER
"Okay for hub to read all*    Attempted to reach patient to schedule surgery clearance appointment. (30 min office visit). But was not able to leave a message due to *call could not be completed at this time\". Unable to leave message.  "

## 2024-02-26 ENCOUNTER — OFFICE VISIT (OUTPATIENT)
Dept: INTERNAL MEDICINE | Facility: CLINIC | Age: 84
End: 2024-02-26
Payer: MEDICARE

## 2024-02-26 VITALS
SYSTOLIC BLOOD PRESSURE: 118 MMHG | HEART RATE: 57 BPM | TEMPERATURE: 97.2 F | RESPIRATION RATE: 16 BRPM | DIASTOLIC BLOOD PRESSURE: 70 MMHG | OXYGEN SATURATION: 96 % | HEIGHT: 71 IN | BODY MASS INDEX: 27.3 KG/M2 | WEIGHT: 195 LBS

## 2024-02-26 DIAGNOSIS — I48.0 PAROXYSMAL ATRIAL FIBRILLATION: ICD-10-CM

## 2024-02-26 DIAGNOSIS — I10 PRIMARY HYPERTENSION: ICD-10-CM

## 2024-02-26 DIAGNOSIS — E78.2 MIXED HYPERLIPIDEMIA: ICD-10-CM

## 2024-02-26 DIAGNOSIS — I25.10 CORONARY ARTERY DISEASE INVOLVING NATIVE CORONARY ARTERY OF NATIVE HEART WITHOUT ANGINA PECTORIS: Primary | ICD-10-CM

## 2024-02-26 PROCEDURE — 3074F SYST BP LT 130 MM HG: CPT | Performed by: INTERNAL MEDICINE

## 2024-02-26 PROCEDURE — 99214 OFFICE O/P EST MOD 30 MIN: CPT | Performed by: INTERNAL MEDICINE

## 2024-02-26 PROCEDURE — 3078F DIAST BP <80 MM HG: CPT | Performed by: INTERNAL MEDICINE

## 2024-02-26 RX ORDER — TRIAMCINOLONE ACETONIDE 1 MG/G
CREAM TOPICAL
COMMUNITY
Start: 2024-01-29

## 2024-02-26 RX ORDER — ERYTHROMYCIN 5 MG/G
OINTMENT OPHTHALMIC
COMMUNITY
Start: 2024-01-26

## 2024-02-26 RX ORDER — KETOCONAZOLE 20 MG/ML
SHAMPOO TOPICAL
COMMUNITY
Start: 2024-01-29

## 2024-02-26 NOTE — PROGRESS NOTES
"Chief Complaint  Surgical Clearance    Subjective        Wilberto Madison presents to [facility]  History of Present Illness  He has upcoming ophthalmic surgery for ptosis more on the left than the right.  Does have a history of chronic atrial fibrillation which is stable.  He is on sotalol and Xarelto.  No other significant concerns.  He is having some difficulty with his vision related to the lid lag  Objective   Vital Signs:  /70 (BP Location: Left arm, Patient Position: Sitting, Cuff Size: Large Adult)   Pulse 57   Temp 97.2 °F (36.2 °C) (Temporal)   Resp 16   Ht 180.3 cm (71\")   Wt 88.5 kg (195 lb)   SpO2 96%   BMI 27.20 kg/m²   Estimated body mass index is 27.2 kg/m² as calculated from the following:    Height as of this encounter: 180.3 cm (71\").    Weight as of this encounter: 88.5 kg (195 lb).             Physical Exam  Vitals and nursing note reviewed.   Constitutional:       Appearance: Normal appearance.   HENT:      Head: Normocephalic and atraumatic.   Cardiovascular:      Rate and Rhythm: Normal rate and regular rhythm. Rhythm irregular.   Pulmonary:      Effort: Pulmonary effort is normal.      Breath sounds: Normal breath sounds.   Abdominal:      General: Abdomen is flat.      Palpations: Abdomen is soft.   Musculoskeletal:         General: No swelling or deformity.      Cervical back: Neck supple.      Right lower leg: No edema.      Left lower leg: No edema.   Skin:     General: Skin is warm.      Capillary Refill: Capillary refill takes less than 2 seconds.      Findings: No rash.   Neurological:      General: No focal deficit present.      Mental Status: He is alert and oriented to person, place, and time.        Result Review :          Previous notes reviewed           Assessment and Plan     Diagnoses and all orders for this visit:    1. Coronary artery disease involving native coronary artery of native heart without angina pectoris " (Primary)    2. Paroxysmal atrial fibrillation    3. Mixed hyperlipidemia    4. Primary hypertension    Preoperative clearance for surgery on lid lag.  Left is greater than the right.  He has been doing very well overall.  Blood pressure looks great today.  Hold the Xarelto 5 days prior to surgery and resume the next postoperative day.  Mild hyponatremia on BMP related to this medication likely.  Would like to set up a wellness exam also.         Follow Up     No follow-ups on file.  Patient was given instructions and counseling regarding his condition or for health maintenance advice. Please see specific information pulled into the AVS if appropriate.

## 2024-03-02 ENCOUNTER — HOSPITAL ENCOUNTER (OUTPATIENT)
Facility: HOSPITAL | Age: 84
Discharge: HOME | End: 2024-03-02
Attending: EMERGENCY MEDICINE | Admitting: EMERGENCY MEDICINE
Payer: MEDICARE

## 2024-03-02 VITALS
RESPIRATION RATE: 16 BRPM | WEIGHT: 195 LBS | HEART RATE: 80 BPM | HEIGHT: 71 IN | OXYGEN SATURATION: 96 % | DIASTOLIC BLOOD PRESSURE: 75 MMHG | BODY MASS INDEX: 27.3 KG/M2 | SYSTOLIC BLOOD PRESSURE: 128 MMHG | TEMPERATURE: 97.7 F

## 2024-03-02 DIAGNOSIS — R05.1 ACUTE COUGH: Primary | ICD-10-CM

## 2024-03-02 LAB
FLUAV SUBTYP SPEC NAA+PROBE: NOT DETECTED
FLUBV RNA ISLT QL NAA+PROBE: NOT DETECTED
RSV RNA NPH QL NAA+NON-PROBE: NOT DETECTED
SARS-COV-2 RNA RESP QL NAA+PROBE: NOT DETECTED

## 2024-03-02 PROCEDURE — 99203 OFFICE O/P NEW LOW 30 MIN: CPT | Performed by: PHYSICIAN ASSISTANT

## 2024-03-02 PROCEDURE — 87637 SARSCOV2&INF A&B&RSV AMP PRB: CPT | Performed by: EMERGENCY MEDICINE

## 2024-03-02 PROCEDURE — G0463 HOSPITAL OUTPT CLINIC VISIT: HCPCS | Performed by: PHYSICIAN ASSISTANT

## 2024-03-02 RX ORDER — AMOXICILLIN 875 MG/1
875 TABLET, COATED ORAL 2 TIMES DAILY
Qty: 14 TABLET | Refills: 0 | Status: SHIPPED | OUTPATIENT
Start: 2024-03-02 | End: 2024-03-03

## 2024-03-02 NOTE — FSED PROVIDER NOTE
Subjective   History of Present Illness  Patient is 73-year-old gentleman that says he had a mild cough and congestion that started last night.  Is actually better today.  He lives in a community for assisted home and wants to make sure he he is not passing it to anyone else.  He had eyelid surgery earlier this week and is using erythromycin eye ointment and wants to make sure that could not be what is causing his symptoms.      Review of Systems   Constitutional: Negative.    HENT:  Positive for congestion. Negative for sore throat.    Eyes:         Some redness and swelling eyelids with some bruising, says much better since surgery   Respiratory:  Positive for cough. Negative for shortness of breath and wheezing.    Gastrointestinal: Negative.    Musculoskeletal: Negative.    Skin: Negative.    Neurological: Negative.    Psychiatric/Behavioral: Negative.     All other systems reviewed and are negative.      Past Medical History:   Diagnosis Date    Arrhythmia     Atrial fibrillation     Benign prostatic hyperplasia without lower urinary tract symptoms     Coronary artery disease     ED (erectile dysfunction)     H/O bone density study 2015    [facility]    Hyperlipidemia     Hypertension     Myocardial infarction     Was called “aborted “ attack    Paroxysmal atrial fibrillation        No Known Allergies    Past Surgical History:   Procedure Laterality Date    ANGIOPLASTY      BACK SURGERY      BIOPSY PROSTATE NEEDLE / PUNCH / INCISIONAL      COLONOSCOPY  2014    NEG    CORONARY ANGIOPLASTY      LAMINECTOMY  2014    LUMBAR       Family History   Problem Relation Age of Onset    Heart disease Father     Heart attack Father     Heart disease Brother         By pass surgery    Stroke Mother        Social History     Socioeconomic History    Marital status:    Tobacco Use    Smoking status: Former     Types: Pipe     Quit date:      Years since quittin.1     Passive exposure: Past     Smokeless tobacco: Never    Tobacco comments:     Never inhaled. Occasional use at desk.   Vaping Use    Vaping status: Never Used   Substance and Sexual Activity    Alcohol use: Yes     Alcohol/week: 6.0 standard drinks of alcohol     Types: 6 Glasses of wine per week     Comment: One glass per day. No increase over timr.  caffiene- denies use    Drug use: No    Sexual activity: Not Currently     Birth control/protection: Vasectomy     Comment: Wife  3 months ago.           Objective   Physical Exam  Vitals reviewed.   Constitutional:       Appearance: Normal appearance. He is normal weight.   HENT:      Head:      Comments: Swelling of eyelids with bruising, worse on right.  Few stitches in place, appears noninfected.  Orbits themselves appear normal     Right Ear: Tympanic membrane normal.      Left Ear: Tympanic membrane normal.      Mouth/Throat:      Pharynx: No oropharyngeal exudate or posterior oropharyngeal erythema.   Eyes:      Conjunctiva/sclera: Conjunctivae normal.   Cardiovascular:      Rate and Rhythm: Normal rate and regular rhythm.      Pulses: Normal pulses.      Heart sounds: Normal heart sounds.   Pulmonary:      Effort: Pulmonary effort is normal.      Comments: Mild coarse sound right lower lobe  Musculoskeletal:         General: Normal range of motion.   Skin:     General: Skin is warm and dry.   Neurological:      Mental Status: He is alert.      Gait: Gait normal.   Psychiatric:         Mood and Affect: Mood normal.         Behavior: Behavior normal.         Procedures           ED Course                                           Medical Decision Making  Presentation patient is nontoxic and well-appearing.  COVID, flu and RSV are negative.  I do hear very mild coarse sound on the right lower lobe.  He recently had surgery of his eyelids this week and using erythromycin ointment.  Do not think this is because his symptoms were he had cough last night and some drainage.  Says he feels  better today but wanted to make sure is not passing anything around coworkers.  I am going to start him on antibiotic because of the recent eyelid surgery.  He will stay isolated for at least a day and part of tomorrow and if he is improving he can go back out.  At this time he is medically cleared with strict return precautions.    Problems Addressed:  Acute cough: complicated acute illness or injury    Amount and/or Complexity of Data Reviewed  Labs: ordered.    Risk  Prescription drug management.        Final diagnoses:   Acute cough       ED Disposition  ED Disposition       ED Disposition   Discharge    Condition   Stable    Comment   --               Larry Schofield ([facility]) MD [facility]  [address] [id number] [phone]    In 1 week           Medication List        New Prescriptions      amoxicillin 875 MG tablet  Commonly known as: AMOXIL  Take 1 tablet by mouth 2 (Two) Times a Day for 7 days.               Where to Get Your Medications        These medications were sent to [facility] - [place], [place] - [address] - [phone]  - [phone]  [address] [facility] [address], [facility] [address]      Phone: [phone]   amoxicillin 875 MG tablet

## 2024-03-03 RX ORDER — AMOXICILLIN 875 MG/1
875 TABLET, COATED ORAL 2 TIMES DAILY
Qty: 14 TABLET | Refills: 0 | Status: SHIPPED | OUTPATIENT
Start: 2024-03-03 | End: 2024-03-10

## 2024-03-15 RX ORDER — HYDROCHLOROTHIAZIDE 12.5 MG/1
TABLET ORAL
Qty: 90 TABLET | Refills: 3 | Status: SHIPPED | OUTPATIENT
Start: 2024-03-15

## 2024-04-15 ENCOUNTER — OFFICE VISIT (OUTPATIENT)
Dept: INTERNAL MEDICINE | Facility: CLINIC | Age: 84
End: 2024-04-15
Payer: MEDICARE

## 2024-04-15 VITALS
DIASTOLIC BLOOD PRESSURE: 58 MMHG | HEART RATE: 88 BPM | TEMPERATURE: 96.9 F | RESPIRATION RATE: 16 BRPM | BODY MASS INDEX: 27.16 KG/M2 | OXYGEN SATURATION: 97 % | HEIGHT: 71 IN | WEIGHT: 194 LBS | SYSTOLIC BLOOD PRESSURE: 90 MMHG

## 2024-04-15 DIAGNOSIS — I95.1 ORTHOSTASIS: ICD-10-CM

## 2024-04-15 DIAGNOSIS — Z00.00 ROUTINE GENERAL MEDICAL EXAMINATION AT A HEALTH CARE FACILITY: Primary | ICD-10-CM

## 2024-04-15 DIAGNOSIS — Z12.5 SPECIAL SCREENING FOR MALIGNANT NEOPLASM OF PROSTATE: ICD-10-CM

## 2024-04-15 DIAGNOSIS — E78.2 MIXED HYPERLIPIDEMIA: ICD-10-CM

## 2024-04-15 DIAGNOSIS — I48.0 PAROXYSMAL ATRIAL FIBRILLATION: ICD-10-CM

## 2024-04-15 DIAGNOSIS — I25.10 CORONARY ARTERY DISEASE INVOLVING NATIVE CORONARY ARTERY OF NATIVE HEART WITHOUT ANGINA PECTORIS: ICD-10-CM

## 2024-04-15 DIAGNOSIS — E74.819 DISORDERS OF GLUCOSE TRANSPORT, UNSPECIFIED: ICD-10-CM

## 2024-04-15 PROCEDURE — 3078F DIAST BP <80 MM HG: CPT | Performed by: INTERNAL MEDICINE

## 2024-04-15 PROCEDURE — G0439 PPPS, SUBSEQ VISIT: HCPCS | Performed by: INTERNAL MEDICINE

## 2024-04-15 PROCEDURE — 1170F FXNL STATUS ASSESSED: CPT | Performed by: INTERNAL MEDICINE

## 2024-04-15 PROCEDURE — 3074F SYST BP LT 130 MM HG: CPT | Performed by: INTERNAL MEDICINE

## 2024-04-15 NOTE — PROGRESS NOTES
The ABCs of the Annual Wellness Visit  [place] to Medicare Visit    Subjective     Wilberto Madison is a 83 y.o. male who presents for a  Welcome to Medicare Visit.    The following portions of the patient's history were reviewed and   updated as appropriate: allergies, current medications, past family history, past medical history, past social history, past surgical history, and problem list.     Compared to one year ago, the patient feels his physical   health is the same.    Compared to one year ago, the patient feels his mental   health is the same.    Recent Hospitalizations:  This patient has had a [facility] admission record on file within the last 365 days.    Current Medical Providers:  Patient Care Team:  Larry Schofield MD (Tony) as PCP - General (Internal Medicine)    Outpatient Medications Prior to Visit   Medication Sig Dispense Refill    atorvastatin (LIPITOR) 10 MG tablet TAKE 1 TABLET DAILY 90 tablet 3    Cholecalciferol (VITAMIN D3) 5000 units capsule capsule Take 1 capsule by mouth Every Night.      Cyanocobalamin (B-12) 1000 MCG capsule Take 1 capsule by mouth Daily.      erythromycin (ROMYCIN) 5 MG/GM ophthalmic ointment       hydroCHLOROthiazide 12.5 MG tablet TAKE 1 TABLET DAILY IN THE MORNING 90 tablet 3    levothyroxine (SYNTHROID, LEVOTHROID) 100 MCG tablet TAKE 1 TABLET DAILY 90 tablet 3    nitroglycerin (Nitrostat) 0.4 MG SL tablet Place 1 tablet under the tongue Every 5 (Five) Minutes As Needed for Chest Pain. Take no more than 3 doses in 15 minutes. 25 tablet 1    Restasis 0.05 % ophthalmic emulsion       sotalol (BETAPACE) 80 MG tablet TAKE 1 TABLET TWICE A  tablet 3    tamsulosin (FLOMAX) 0.4 MG capsule 24 hr capsule TAKE 1 CAPSULE TWICE A  capsule 3    triamcinolone (KENALOG) 0.1 % cream       Xarelto 20 MG tablet TAKE 1 TABLET DAILY 90 tablet 3    amLODIPine (NORVASC) 5 MG tablet TAKE 1 TABLET DAILY 90 tablet 3    ketoconazole (NIZORAL) 2 % shampoo  (Patient  "not taking: Reported on 2024)       No facility-administered medications prior to visit.       No opioid medication identified on active medication list. I have reviewed chart for other potential  high risk medication/s and harmful drug interactions in the elderly.        Aspirin is not on active medication list.  Aspirin use is not indicated based on review of current medical condition/s. Risk of harm outweighs potential benefits.  .    Patient Active Problem List   Diagnosis    Paroxysmal atrial fibrillation    Hyperlipidemia    Hypertension    Benign prostatic hyperplasia without lower urinary tract symptoms    Coronary artery disease involving native coronary artery of native heart without angina pectoris     Advance Care Planning   Advance Care Planning     Advance Directive is on file.  ACP discussion was held with the patient during this visit. Patient has an advance directive in EMR which is still valid.        Objective   Vitals:    04/15/24 1013   BP: 90/58   BP Location: Left arm   Patient Position: Standing  Comment: patient requested to stand   Cuff Size: Large Adult   Pulse: 88   Resp: 16   Temp: 96.9 °F (36.1 °C)   TempSrc: Temporal   SpO2: 97%   Weight: 88 kg (194 lb)   Height: 180.3 cm (71\")     Estimated body mass index is 27.06 kg/m² as calculated from the following:    Height as of this encounter: 180.3 cm (71\").    Weight as of this encounter: 88 kg (194 lb).    BMI is >= 25 and <30. (Overweight) The following options were offered after discussion;: nutrition counseling/recommendations      Does the patient have evidence of cognitive impairment?   No         Procedures       HEALTH RISK ASSESSMENT    Smoking Status:  Social History     Tobacco Use   Smoking Status Former    Types: Pipe    Quit date: 1980    Years since quittin.3    Passive exposure: Past   Smokeless Tobacco Never   Tobacco Comments    Never inhaled. Occasional use at desk.     Alcohol Consumption:  Social History "     Substance and Sexual Activity   Alcohol Use Yes    Alcohol/week: 6.0 standard drinks of alcohol    Types: 6 Glasses of wine per week    Comment: One glass per day. No increase over timr.  caffiene- denies use       Fall Risk Screen:    ERIC Fall Risk Assessment was completed, and patient is at LOW risk for falls.Assessment completed on:4/15/2024    Depression Screen:       4/15/2024    10:16 AM   PHQ-2/PHQ-9 Depression Screening   Little Interest or Pleasure in Doing Things 0-->not at all   Feeling Down, Depressed or Hopeless 0-->not at all   PHQ-9: Brief Depression Severity Measure Score 0       Health Habits and Functional and Cognitive Screenin/15/2024    10:15 AM   Functional & Cognitive Status   Do you have difficulty preparing food and eating? No   Do you have difficulty bathing yourself, getting dressed or grooming yourself? No   Do you have difficulty using the toilet? No   Do you have difficulty moving around from place to place? No   Do you have trouble with steps or getting out of a bed or a chair? No   Current Diet Well Balanced Diet   Dental Exam Up to date   Eye Exam Up to date   Exercise (times per week) 3 times per week   Current Exercises Include Walking   Do you need help using the phone?  No   Are you deaf or do you have serious difficulty hearing?  No   Do you need help to go to places out of walking distance? No   Do you need help shopping? No   Do you need help preparing meals?  No   Do you need help with housework?  No   Do you need help with laundry? No   Do you need help taking your medications? No   Do you need help managing money? No   Do you ever drive or ride in a car without wearing a seat belt? No   Have you felt unusual stress, anger or loneliness in the last month? No   Who do you live with? Alone   If you need help, do you have trouble finding someone available to you? No   Have you been bothered in the last four weeks by sexual problems? No   Do you have difficulty  concentrating, remembering or making decisions? No       Visual Acuity:    No results found.    Age-appropriate Screening Schedule:  Refer to the list below for future screening recommendations based on patient's age, sex and/or medical conditions. Orders for these recommended tests are listed in the plan section. The patient has been provided with a written plan.    Health Maintenance   Topic Date Due    RSV Vaccine - Adults (1 - 1-dose 60+ series) Never done    ZOSTER VACCINE (2 of 2) 04/15/2015    ANNUAL WELLNESS VISIT  01/20/2024    LIPID PANEL  01/20/2024    BMI FOLLOWUP  01/20/2024    INFLUENZA VACCINE  08/01/2024    TDAP/TD VACCINES (2 - Td or Tdap) 04/19/2025    COVID-19 Vaccine  Completed    Pneumococcal Vaccine 65+  Completed        CMS Preventative Services Quick Reference  Risk Factors Identified During Encounter    Immunizations Discussed/Encouraged: Shingrix and RSV (Respiratory Syncytial Virus)  The above risks/problems have been discussed with the patient.  Pertinent information has been shared with the patient in the After Visit Summary.    Follow Up:   Initial Medicare Visit in one year    An After Visit Summary and PPPS were made available to the patient.      Additional E&M Note during same encounter follows:  Patient has multiple medical problems which are significant and separately identifiable that require additional work above and beyond the Medicare Wellness Visit.      Chief Complaint  Medicare Wellness-subsequent    Subjective        HPI  Wilberto Madison is also being seen today for he is complaining of some fatigue and sometimes lightheadedness with standing.  [facility] and seems to enjoy that quite a bit.  His wife passed away last year after a long anguiano with Alzheimer's.  He has not had any medication changes but in a less stressful place in life at this point.         Objective   Vital Signs:  BP 90/58 (BP Location: Left arm, Patient Position: Standing,  "Cuff Size: Large Adult) Comment (Patient Position): patient requested to stand  Pulse 88   Temp 96.9 °F (36.1 °C) (Temporal)   Resp 16   Ht 180.3 cm (71\")   Wt 88 kg (194 lb)   SpO2 97%   BMI 27.06 kg/m²     Physical Exam  Vitals and nursing note reviewed.   Constitutional:       Appearance: Normal appearance.   HENT:      Head: Normocephalic and atraumatic.   Cardiovascular:      Rate and Rhythm: Normal rate and regular rhythm.   Pulmonary:      Effort: Pulmonary effort is normal.      Breath sounds: Normal breath sounds.   Abdominal:      General: Abdomen is flat.      Palpations: Abdomen is soft.   Musculoskeletal:         General: No swelling or deformity.      Cervical back: Neck supple.      Right lower leg: No edema.      Left lower leg: No edema.   Skin:     General: Skin is warm.      Capillary Refill: Capillary refill takes less than 2 seconds.      Findings: No rash.   Neurological:      General: No focal deficit present.      Mental Status: He is alert and oriented to person, place, and time.                 Previous labs reviewed     Assessment and Plan   Diagnoses and all orders for this visit:    1. Routine general medical examination at a health care facility (Primary)    2. Coronary artery disease involving native coronary artery of native heart without angina pectoris  -     Urinalysis With Microscopic - Urine, Clean Catch  -     CBC & Differential  -     Comprehensive Metabolic Panel  -     Lipid Panel With / Chol / HDL Ratio  -     TSH  -     PSA Screen  -     Hemoglobin A1c    3. Paroxysmal atrial fibrillation  -     Urinalysis With Microscopic - Urine, Clean Catch  -     CBC & Differential  -     Comprehensive Metabolic Panel  -     Lipid Panel With / Chol / HDL Ratio  -     TSH  -     PSA Screen  -     Hemoglobin A1c    4. Mixed hyperlipidemia  -     Urinalysis With Microscopic - Urine, Clean Catch  -     CBC & Differential  -     Comprehensive Metabolic Panel  -     Lipid Panel With / " Chol / HDL Ratio  -     TSH  -     PSA Screen  -     Hemoglobin A1c    5. Special screening for malignant neoplasm of prostate  -     Urinalysis With Microscopic - Urine, Clean Catch  -     CBC & Differential  -     Comprehensive Metabolic Panel  -     Lipid Panel With / Chol / HDL Ratio  -     TSH  -     PSA Screen  -     Hemoglobin A1c    6. Disorders of glucose transport, unspecified  -     Hemoglobin A1c    7. Orthostasis    Very nice gentleman here for wellness exam.  He is doing well in assisted living at the [facility].  He is having some orthostatic symptoms and my repeat blood pressure was 90/64 and standing 84/58.  No medication changes recently but he is under less stress as his wife passed away last year which may have something to do with that.  Check lab work and urine.  Hold the hydrochlorothiazide for 3 days and discontinue the amlodipine.  Monitor blood pressure and follow-up here in about 2 weeks to recheck or sooner if any problems.  Body mass index is 27.06 kg/m².           Follow Up   Return in about 4 weeks (around 5/13/2024).  Patient was given instructions and counseling regarding his condition or for health maintenance advice. Please see specific information pulled into the AVS if appropriate.

## 2024-04-16 LAB
ALBUMIN SERPL-MCNC: 4.1 G/DL (ref 3.5–5.2)
ALBUMIN/GLOB SERPL: 1.9 G/DL
ALP SERPL-CCNC: 74 U/L (ref 39–117)
ALT SERPL-CCNC: 17 U/L (ref 1–41)
APPEARANCE UR: CLEAR
AST SERPL-CCNC: 20 U/L (ref 1–40)
BACTERIA #/AREA URNS HPF: NORMAL /HPF
BASOPHILS # BLD AUTO: 0.05 10*3/MM3 (ref 0–0.2)
BASOPHILS NFR BLD AUTO: 0.8 % (ref 0–1.5)
BILIRUB SERPL-MCNC: 0.7 MG/DL (ref 0–1.2)
BILIRUB UR QL STRIP: NEGATIVE
BUN SERPL-MCNC: 13 MG/DL (ref 8–23)
BUN/CREAT SERPL: 12.1 (ref 7–25)
CALCIUM SERPL-MCNC: 9.3 MG/DL (ref 8.6–10.5)
CASTS URNS MICRO: NORMAL
CHLORIDE SERPL-SCNC: 95 MMOL/L (ref 98–107)
CHOLEST SERPL-MCNC: 127 MG/DL (ref 0–200)
CHOLEST/HDLC SERPL: 2.89 {RATIO}
CO2 SERPL-SCNC: 27.9 MMOL/L (ref 22–29)
COLOR UR: YELLOW
CREAT SERPL-MCNC: 1.07 MG/DL (ref 0.76–1.27)
EGFRCR SERPLBLD CKD-EPI 2021: 68 ML/MIN/1.73
EOSINOPHIL # BLD AUTO: 0.23 10*3/MM3 (ref 0–0.4)
EOSINOPHIL NFR BLD AUTO: 3.8 % (ref 0.3–6.2)
EPI CELLS #/AREA URNS HPF: NORMAL /HPF
ERYTHROCYTE [DISTWIDTH] IN BLOOD BY AUTOMATED COUNT: 12 % (ref 12.3–15.4)
GLOBULIN SER CALC-MCNC: 2.2 GM/DL
GLUCOSE SERPL-MCNC: 74 MG/DL (ref 65–99)
GLUCOSE UR QL STRIP: NEGATIVE
HBA1C MFR BLD: 6 % (ref 4.8–5.6)
HCT VFR BLD AUTO: 46.7 % (ref 37.5–51)
HDLC SERPL-MCNC: 44 MG/DL (ref 40–60)
HGB BLD-MCNC: 15.8 G/DL (ref 13–17.7)
HGB UR QL STRIP: NEGATIVE
IMM GRANULOCYTES # BLD AUTO: 0.02 10*3/MM3 (ref 0–0.05)
IMM GRANULOCYTES NFR BLD AUTO: 0.3 % (ref 0–0.5)
KETONES UR QL STRIP: NEGATIVE
LDLC SERPL CALC-MCNC: 68 MG/DL (ref 0–100)
LEUKOCYTE ESTERASE UR QL STRIP: NEGATIVE
LYMPHOCYTES # BLD AUTO: 0.85 10*3/MM3 (ref 0.7–3.1)
LYMPHOCYTES NFR BLD AUTO: 14.2 % (ref 19.6–45.3)
MCH RBC QN AUTO: 30.4 PG (ref 26.6–33)
MCHC RBC AUTO-ENTMCNC: 33.8 G/DL (ref 31.5–35.7)
MCV RBC AUTO: 89.8 FL (ref 79–97)
MONOCYTES # BLD AUTO: 0.77 10*3/MM3 (ref 0.1–0.9)
MONOCYTES NFR BLD AUTO: 12.8 % (ref 5–12)
NEUTROPHILS # BLD AUTO: 4.08 10*3/MM3 (ref 1.7–7)
NEUTROPHILS NFR BLD AUTO: 68.1 % (ref 42.7–76)
NITRITE UR QL STRIP: NEGATIVE
NRBC BLD AUTO-RTO: 0 /100 WBC (ref 0–0.2)
PH UR STRIP: 6.5 [PH] (ref 5–8)
PLATELET # BLD AUTO: 200 10*3/MM3 (ref 140–450)
POTASSIUM SERPL-SCNC: 4.1 MMOL/L (ref 3.5–5.2)
PROT SERPL-MCNC: 6.3 G/DL (ref 6–8.5)
PROT UR QL STRIP: NEGATIVE
PSA SERPL-MCNC: 5.97 NG/ML (ref 0–4)
RBC # BLD AUTO: 5.2 10*6/MM3 (ref 4.14–5.8)
RBC #/AREA URNS HPF: NORMAL /HPF
SODIUM SERPL-SCNC: 135 MMOL/L (ref 136–145)
SP GR UR STRIP: 1.01 (ref 1–1.03)
TRIGL SERPL-MCNC: 73 MG/DL (ref 0–150)
TSH SERPL DL<=0.005 MIU/L-ACNC: 2.55 UIU/ML (ref 0.27–4.2)
UROBILINOGEN UR STRIP-MCNC: NORMAL MG/DL
VLDLC SERPL CALC-MCNC: 15 MG/DL (ref 5–40)
WBC # BLD AUTO: 6 10*3/MM3 (ref 3.4–10.8)
WBC #/AREA URNS HPF: NORMAL /HPF

## 2024-05-08 ENCOUNTER — OFFICE VISIT (OUTPATIENT)
Dept: CARDIOLOGY | Facility: CLINIC | Age: 84
End: 2024-05-08
Payer: MEDICARE

## 2024-05-08 VITALS
SYSTOLIC BLOOD PRESSURE: 120 MMHG | HEIGHT: 71 IN | HEART RATE: 83 BPM | BODY MASS INDEX: 27.3 KG/M2 | DIASTOLIC BLOOD PRESSURE: 80 MMHG | WEIGHT: 195 LBS

## 2024-05-08 DIAGNOSIS — R06.09 DYSPNEA ON EXERTION: ICD-10-CM

## 2024-05-08 DIAGNOSIS — I10 PRIMARY HYPERTENSION: ICD-10-CM

## 2024-05-08 DIAGNOSIS — I48.19 PERSISTENT ATRIAL FIBRILLATION: Primary | ICD-10-CM

## 2024-05-08 DIAGNOSIS — E78.2 MIXED HYPERLIPIDEMIA: ICD-10-CM

## 2024-05-08 DIAGNOSIS — I25.10 CORONARY ARTERY DISEASE INVOLVING NATIVE CORONARY ARTERY OF NATIVE HEART WITHOUT ANGINA PECTORIS: ICD-10-CM

## 2024-05-08 PROCEDURE — 3079F DIAST BP 80-89 MM HG: CPT | Performed by: NURSE PRACTITIONER

## 2024-05-08 PROCEDURE — 93000 ELECTROCARDIOGRAM COMPLETE: CPT | Performed by: NURSE PRACTITIONER

## 2024-05-08 PROCEDURE — 1160F RVW MEDS BY RX/DR IN RCRD: CPT | Performed by: NURSE PRACTITIONER

## 2024-05-08 PROCEDURE — 1159F MED LIST DOCD IN RCRD: CPT | Performed by: NURSE PRACTITIONER

## 2024-05-08 PROCEDURE — 99214 OFFICE O/P EST MOD 30 MIN: CPT | Performed by: NURSE PRACTITIONER

## 2024-05-08 PROCEDURE — 3074F SYST BP LT 130 MM HG: CPT | Performed by: NURSE PRACTITIONER

## 2024-05-08 RX ORDER — HYDROCHLOROTHIAZIDE 12.5 MG/1
12.5 TABLET ORAL EVERY MORNING
Start: 2024-05-08

## 2024-05-09 ENCOUNTER — HOSPITAL ENCOUNTER (OUTPATIENT)
Dept: CARDIOLOGY | Facility: HOSPITAL | Age: 84
Discharge: HOME | End: 2024-05-09
Payer: MEDICARE

## 2024-05-09 VITALS
HEART RATE: 79 BPM | BODY MASS INDEX: 27.3 KG/M2 | SYSTOLIC BLOOD PRESSURE: 150 MMHG | DIASTOLIC BLOOD PRESSURE: 82 MMHG | WEIGHT: 195 LBS | HEIGHT: 71 IN

## 2024-05-09 DIAGNOSIS — I48.19 PERSISTENT ATRIAL FIBRILLATION: ICD-10-CM

## 2024-05-09 DIAGNOSIS — R06.09 DYSPNEA ON EXERTION: ICD-10-CM

## 2024-05-09 PROCEDURE — 93306 TTE W/DOPPLER COMPLETE: CPT

## 2024-05-10 ENCOUNTER — TELEPHONE (OUTPATIENT)
Dept: CARDIOLOGY | Facility: CLINIC | Age: 84
End: 2024-05-10
Payer: MEDICARE

## 2024-05-10 LAB
AORTIC ARCH: 2.4 CM
ASCENDING AORTA: 3.6 CM
BH CV ECHO MEAS - ACS: 2.27 CM
BH CV ECHO MEAS - AO MAX PG: 6.1 MMHG
BH CV ECHO MEAS - AO MEAN PG: 3 MMHG
BH CV ECHO MEAS - AO ROOT DIAM: 4.1 CM
BH CV ECHO MEAS - AO V2 MAX: 123 CM/SEC
BH CV ECHO MEAS - AO V2 VTI: 22.6 CM
BH CV ECHO MEAS - AVA(I,D): 2.44 CM2
BH CV ECHO MEAS - EDV(CUBED): 117.6 ML
BH CV ECHO MEAS - EDV(MOD-SP2): 50 ML
BH CV ECHO MEAS - EDV(MOD-SP4): 49 ML
BH CV ECHO MEAS - EF(MOD-BP): 60.7 %
BH CV ECHO MEAS - EF(MOD-SP2): 58 %
BH CV ECHO MEAS - EF(MOD-SP4): 61.2 %
BH CV ECHO MEAS - ESV(CUBED): 36 ML
BH CV ECHO MEAS - ESV(MOD-SP2): 21 ML
BH CV ECHO MEAS - ESV(MOD-SP4): 19 ML
BH CV ECHO MEAS - FS: 32.6 %
BH CV ECHO MEAS - IVS/LVPW: 1.11 CM
BH CV ECHO MEAS - IVSD: 1 CM
BH CV ECHO MEAS - LAT PEAK E' VEL: 13.2 CM/SEC
BH CV ECHO MEAS - LV DIASTOLIC VOL/BSA (35-75): 23.5 CM2
BH CV ECHO MEAS - LV MASS(C)D: 164.3 GRAMS
BH CV ECHO MEAS - LV MAX PG: 3.5 MMHG
BH CV ECHO MEAS - LV MEAN PG: 2 MMHG
BH CV ECHO MEAS - LV SYSTOLIC VOL/BSA (12-30): 9.1 CM2
BH CV ECHO MEAS - LV V1 MAX: 93.8 CM/SEC
BH CV ECHO MEAS - LV V1 VTI: 17.7 CM
BH CV ECHO MEAS - LVIDD: 4.9 CM
BH CV ECHO MEAS - LVIDS: 3.3 CM
BH CV ECHO MEAS - LVOT AREA: 3.1 CM2
BH CV ECHO MEAS - LVOT DIAM: 1.99 CM
BH CV ECHO MEAS - LVPWD: 0.9 CM
BH CV ECHO MEAS - MED PEAK E' VEL: 8.4 CM/SEC
BH CV ECHO MEAS - MR MAX PG: 91.8 MMHG
BH CV ECHO MEAS - MR MAX VEL: 479.2 CM/SEC
BH CV ECHO MEAS - MV DEC SLOPE: 784.3 CM/SEC2
BH CV ECHO MEAS - MV DEC TIME: 0.12 SEC
BH CV ECHO MEAS - MV E MAX VEL: 98.1 CM/SEC
BH CV ECHO MEAS - MV MAX PG: 5.3 MMHG
BH CV ECHO MEAS - MV MEAN PG: 1.96 MMHG
BH CV ECHO MEAS - MV P1/2T: 43.4 MSEC
BH CV ECHO MEAS - MV V2 VTI: 18.2 CM
BH CV ECHO MEAS - MVA(P1/2T): 5.1 CM2
BH CV ECHO MEAS - MVA(VTI): 3 CM2
BH CV ECHO MEAS - PA ACC TIME: 0.1 SEC
BH CV ECHO MEAS - PA V2 MAX: 112.7 CM/SEC
BH CV ECHO MEAS - PI END-D VEL: 112.7 CM/SEC
BH CV ECHO MEAS - QP/QS: 0.76
BH CV ECHO MEAS - RAP SYSTOLE: 3 MMHG
BH CV ECHO MEAS - RV MAX PG: 1.36 MMHG
BH CV ECHO MEAS - RV V1 MAX: 58.3 CM/SEC
BH CV ECHO MEAS - RV V1 VTI: 10.5 CM
BH CV ECHO MEAS - RVOT DIAM: 2.25 CM
BH CV ECHO MEAS - RVSP: 33 MMHG
BH CV ECHO MEAS - SUP REN AO DIAM: 2.1 CM
BH CV ECHO MEAS - SV(LVOT): 55 ML
BH CV ECHO MEAS - SV(MOD-SP2): 29 ML
BH CV ECHO MEAS - SV(MOD-SP4): 30 ML
BH CV ECHO MEAS - SV(RVOT): 41.7 ML
BH CV ECHO MEAS - SVI(LVOT): 26.4 ML/M2
BH CV ECHO MEAS - SVI(MOD-SP2): 13.9 ML/M2
BH CV ECHO MEAS - SVI(MOD-SP4): 14.4 ML/M2
BH CV ECHO MEAS - TAPSE (>1.6): 2.28 CM
BH CV ECHO MEAS - TR MAX PG: 30.4 MMHG
BH CV ECHO MEAS - TR MAX VEL: 275.5 CM/SEC
BH CV ECHO MEASUREMENTS AVERAGE E/E' RATIO: 9.08
BH CV XLRA - RV BASE: 3.3 CM
BH CV XLRA - RV LENGTH: 7.4 CM
BH CV XLRA - RV MID: 2.19 CM
BH CV XLRA - TDI S': 14.1 CM/SEC
LEFT ATRIUM VOLUME INDEX: 35 ML/M2
SINUS: 4.1 CM
STJ: 3.2 CM

## 2024-05-14 ENCOUNTER — OFFICE VISIT (OUTPATIENT)
Dept: INTERNAL MEDICINE | Facility: CLINIC | Age: 84
End: 2024-05-14
Payer: MEDICARE

## 2024-05-14 VITALS
RESPIRATION RATE: 16 BRPM | BODY MASS INDEX: 27.58 KG/M2 | DIASTOLIC BLOOD PRESSURE: 82 MMHG | OXYGEN SATURATION: 98 % | HEIGHT: 71 IN | WEIGHT: 197 LBS | HEART RATE: 79 BPM | TEMPERATURE: 96.8 F | SYSTOLIC BLOOD PRESSURE: 132 MMHG

## 2024-05-14 DIAGNOSIS — I10 PRIMARY HYPERTENSION: ICD-10-CM

## 2024-05-14 DIAGNOSIS — I25.10 CORONARY ARTERY DISEASE INVOLVING NATIVE CORONARY ARTERY OF NATIVE HEART WITHOUT ANGINA PECTORIS: ICD-10-CM

## 2024-05-14 DIAGNOSIS — I48.19 PERSISTENT ATRIAL FIBRILLATION: Primary | ICD-10-CM

## 2024-05-14 PROCEDURE — 99214 OFFICE O/P EST MOD 30 MIN: CPT | Performed by: INTERNAL MEDICINE

## 2024-05-14 PROCEDURE — 3075F SYST BP GE 130 - 139MM HG: CPT | Performed by: INTERNAL MEDICINE

## 2024-05-14 PROCEDURE — 3079F DIAST BP 80-89 MM HG: CPT | Performed by: INTERNAL MEDICINE

## 2024-05-14 NOTE — PROGRESS NOTES
"Chief Complaint  Follow-up    Subjective        Wilberto Madison presents to [facility]  History of Present Illness  Nice gentleman here for follow-up of hypertension and chronic atrial fibrillation.  Saw cardiology recently and had an essentially normal echocardiogram.  We saw him last month for orthostasis and discontinued his amlodipine with improvement in symptoms.  Overall he is feeling pretty well.  Lives at the assisted living facility and that is working out well  Objective   Vital Signs:  /82 (BP Location: Left arm, Patient Position: Sitting, Cuff Size: Large Adult)   Pulse 79   Temp 96.8 °F (36 °C) (Temporal)   Resp 16   Ht 180.3 cm (71\")   Wt 89.4 kg (197 lb)   SpO2 98%   BMI 27.48 kg/m²   Estimated body mass index is 27.48 kg/m² as calculated from the following:    Height as of this encounter: 180.3 cm (71\").    Weight as of this encounter: 89.4 kg (197 lb).               Physical Exam  Vitals and nursing note reviewed.   Constitutional:       Appearance: Normal appearance.   HENT:      Head: Normocephalic and atraumatic.   Cardiovascular:      Rate and Rhythm: Normal rate. Rhythm irregular.   Pulmonary:      Effort: Pulmonary effort is normal.      Breath sounds: Normal breath sounds.   Abdominal:      General: Abdomen is flat.      Palpations: Abdomen is soft.   Musculoskeletal:         General: No swelling or deformity.      Cervical back: Neck supple.      Right lower leg: No edema.      Left lower leg: No edema.   Skin:     General: Skin is warm.      Capillary Refill: Capillary refill takes less than 2 seconds.      Findings: No rash.   Neurological:      General: No focal deficit present.      Mental Status: He is alert and oriented to person, place, and time.        Result Review :          Echocardiogram reviewed with patient           Assessment and Plan     Diagnoses and all orders for this visit:    1. Persistent atrial fibrillation " (Primary)    2. Primary hypertension    3. Coronary artery disease involving native coronary artery of native heart without angina pectoris    Hypertension stable.  Off the amlodipine and will continue with current medication regimen.  In atrial fibrillation and relatively asymptomatic.  He has been on anticoagulation for years so nothing new.  He did talk with cardiology apparently about cardioversion.  Probably not completely necessary unless becomes more symptomatic.  Continue with anticoagulation.  Recheck in about 4 months         Follow Up     Return in about 4 months (around 9/14/2024).  Patient was given instructions and counseling regarding his condition or for health maintenance advice. Please see specific information pulled into the AVS if appropriate.

## 2024-05-28 ENCOUNTER — APPOINTMENT (OUTPATIENT)
Dept: CT IMAGING | Facility: HOSPITAL | Age: 84
End: 2024-05-28
Payer: MEDICARE

## 2024-05-28 ENCOUNTER — HOSPITAL ENCOUNTER (OUTPATIENT)
Facility: HOSPITAL | Age: 84
Setting detail: OBSERVATION
Discharge: HOME | End: 2024-05-29
Attending: EMERGENCY MEDICINE | Admitting: EMERGENCY MEDICINE
Payer: MEDICARE

## 2024-05-28 ENCOUNTER — APPOINTMENT (OUTPATIENT)
Dept: MRI IMAGING | Facility: HOSPITAL | Age: 84
End: 2024-05-28
Payer: MEDICARE

## 2024-05-28 DIAGNOSIS — G45.9 TIA (TRANSIENT ISCHEMIC ATTACK): Primary | ICD-10-CM

## 2024-05-28 DIAGNOSIS — I48.20 CHRONIC A-FIB: ICD-10-CM

## 2024-05-28 DIAGNOSIS — Z79.01 CHRONIC ANTICOAGULATION: ICD-10-CM

## 2024-05-28 LAB
ALBUMIN SERPL-MCNC: 3.8 G/DL (ref 3.5–5.2)
ALBUMIN/GLOB SERPL: 1.7 G/DL
ALP SERPL-CCNC: 61 U/L (ref 39–117)
ALT SERPL W P-5'-P-CCNC: 18 U/L (ref 1–41)
ANION GAP SERPL CALCULATED.3IONS-SCNC: 9 MMOL/L (ref 5–15)
AST SERPL-CCNC: 16 U/L (ref 1–40)
BASOPHILS # BLD AUTO: 0.05 10*3/MM3 (ref 0–0.2)
BASOPHILS NFR BLD AUTO: 0.7 % (ref 0–1.5)
BILIRUB SERPL-MCNC: 0.5 MG/DL (ref 0–1.2)
BUN SERPL-MCNC: 16 MG/DL (ref 8–23)
BUN/CREAT SERPL: 14.4 (ref 7–25)
CALCIUM SPEC-SCNC: 8.6 MG/DL (ref 8.6–10.5)
CHLORIDE SERPL-SCNC: 95 MMOL/L (ref 98–107)
CO2 SERPL-SCNC: 29 MMOL/L (ref 22–29)
CREAT SERPL-MCNC: 1.11 MG/DL (ref 0.76–1.27)
DEPRECATED RDW RBC AUTO: 42.4 FL (ref 37–54)
EGFRCR SERPLBLD CKD-EPI 2021: 65.1 ML/MIN/1.73
EOSINOPHIL # BLD AUTO: 0.19 10*3/MM3 (ref 0–0.4)
EOSINOPHIL NFR BLD AUTO: 2.8 % (ref 0.3–6.2)
ERYTHROCYTE [DISTWIDTH] IN BLOOD BY AUTOMATED COUNT: 12.5 % (ref 12.3–15.4)
GEN 5 2HR TROPONIN T REFLEX: 11 NG/L
GLOBULIN UR ELPH-MCNC: 2.3 GM/DL
GLUCOSE SERPL-MCNC: 106 MG/DL (ref 65–99)
HCT VFR BLD AUTO: 43.8 % (ref 37.5–51)
HGB BLD-MCNC: 14.8 G/DL (ref 13–17.7)
IMM GRANULOCYTES # BLD AUTO: 0.03 10*3/MM3 (ref 0–0.05)
IMM GRANULOCYTES NFR BLD AUTO: 0.4 % (ref 0–0.5)
INR PPP: 1.26 (ref 0.9–1.1)
LYMPHOCYTES # BLD AUTO: 0.82 10*3/MM3 (ref 0.7–3.1)
LYMPHOCYTES NFR BLD AUTO: 11.9 % (ref 19.6–45.3)
MCH RBC QN AUTO: 31.7 PG (ref 26.6–33)
MCHC RBC AUTO-ENTMCNC: 33.8 G/DL (ref 31.5–35.7)
MCV RBC AUTO: 93.8 FL (ref 79–97)
MONOCYTES # BLD AUTO: 0.64 10*3/MM3 (ref 0.1–0.9)
MONOCYTES NFR BLD AUTO: 9.3 % (ref 5–12)
NEUTROPHILS NFR BLD AUTO: 5.17 10*3/MM3 (ref 1.7–7)
NEUTROPHILS NFR BLD AUTO: 74.9 % (ref 42.7–76)
NRBC BLD AUTO-RTO: 0 /100 WBC (ref 0–0.2)
PLATELET # BLD AUTO: 159 10*3/MM3 (ref 140–450)
PMV BLD AUTO: 8.9 FL (ref 6–12)
POTASSIUM SERPL-SCNC: 3.9 MMOL/L (ref 3.5–5.2)
PROT SERPL-MCNC: 6.1 G/DL (ref 6–8.5)
PROTHROMBIN TIME: 16 SECONDS (ref 11.7–14.2)
RBC # BLD AUTO: 4.67 10*6/MM3 (ref 4.14–5.8)
SODIUM SERPL-SCNC: 133 MMOL/L (ref 136–145)
TROPONIN T DELTA: -5 NG/L
TROPONIN T SERPL HS-MCNC: 16 NG/L
WBC NRBC COR # BLD AUTO: 6.9 10*3/MM3 (ref 3.4–10.8)

## 2024-05-28 PROCEDURE — G0378 HOSPITAL OBSERVATION PER HR: HCPCS

## 2024-05-28 PROCEDURE — 80053 COMPREHEN METABOLIC PANEL: CPT | Performed by: EMERGENCY MEDICINE

## 2024-05-28 PROCEDURE — 99291 CRITICAL CARE FIRST HOUR: CPT

## 2024-05-28 PROCEDURE — 93010 ELECTROCARDIOGRAM REPORT: CPT | Performed by: INTERNAL MEDICINE

## 2024-05-28 PROCEDURE — 85610 PROTHROMBIN TIME: CPT | Performed by: EMERGENCY MEDICINE

## 2024-05-28 PROCEDURE — 0042T HC CT CEREBRAL PERFUSION W/WO CONTRAST: CPT

## 2024-05-28 PROCEDURE — 84484 ASSAY OF TROPONIN QUANT: CPT | Performed by: EMERGENCY MEDICINE

## 2024-05-28 PROCEDURE — 96360 HYDRATION IV INFUSION INIT: CPT

## 2024-05-28 PROCEDURE — 70496 CT ANGIOGRAPHY HEAD: CPT

## 2024-05-28 PROCEDURE — 25510000001 IOPAMIDOL PER 1 ML: Performed by: EMERGENCY MEDICINE

## 2024-05-28 PROCEDURE — 99285 EMERGENCY DEPT VISIT HI MDM: CPT

## 2024-05-28 PROCEDURE — 25810000003 SODIUM CHLORIDE 0.9 % SOLUTION: Performed by: EMERGENCY MEDICINE

## 2024-05-28 PROCEDURE — 82565 ASSAY OF CREATININE: CPT

## 2024-05-28 PROCEDURE — 85025 COMPLETE CBC W/AUTO DIFF WBC: CPT | Performed by: EMERGENCY MEDICINE

## 2024-05-28 PROCEDURE — 93005 ELECTROCARDIOGRAM TRACING: CPT | Performed by: EMERGENCY MEDICINE

## 2024-05-28 PROCEDURE — 36415 COLL VENOUS BLD VENIPUNCTURE: CPT

## 2024-05-28 PROCEDURE — 96361 HYDRATE IV INFUSION ADD-ON: CPT

## 2024-05-28 PROCEDURE — 70498 CT ANGIOGRAPHY NECK: CPT

## 2024-05-28 PROCEDURE — 70553 MRI BRAIN STEM W/O & W/DYE: CPT

## 2024-05-28 RX ORDER — SODIUM CHLORIDE 0.9 % (FLUSH) 0.9 %
10 SYRINGE (ML) INJECTION EVERY 12 HOURS SCHEDULED
Status: DISCONTINUED | OUTPATIENT
Start: 2024-05-28 | End: 2024-05-29 | Stop reason: HOSPADM

## 2024-05-28 RX ORDER — SODIUM CHLORIDE 0.9 % (FLUSH) 0.9 %
10 SYRINGE (ML) INJECTION AS NEEDED
Status: DISCONTINUED | OUTPATIENT
Start: 2024-05-28 | End: 2024-05-29 | Stop reason: HOSPADM

## 2024-05-28 RX ORDER — BISACODYL 5 MG/1
5 TABLET, DELAYED RELEASE ORAL DAILY PRN
Status: DISCONTINUED | OUTPATIENT
Start: 2024-05-28 | End: 2024-05-29 | Stop reason: HOSPADM

## 2024-05-28 RX ORDER — ATORVASTATIN CALCIUM 10 MG/1
10 TABLET, FILM COATED ORAL NIGHTLY
Status: DISCONTINUED | OUTPATIENT
Start: 2024-05-29 | End: 2024-05-29 | Stop reason: HOSPADM

## 2024-05-28 RX ORDER — SOTALOL HYDROCHLORIDE 80 MG/1
80 TABLET ORAL 2 TIMES DAILY
Status: DISCONTINUED | OUTPATIENT
Start: 2024-05-29 | End: 2024-05-29 | Stop reason: HOSPADM

## 2024-05-28 RX ORDER — SODIUM CHLORIDE 9 MG/ML
40 INJECTION, SOLUTION INTRAVENOUS AS NEEDED
Status: DISCONTINUED | OUTPATIENT
Start: 2024-05-28 | End: 2024-05-29 | Stop reason: HOSPADM

## 2024-05-28 RX ORDER — HYDROCHLOROTHIAZIDE 12.5 MG/1
12.5 TABLET ORAL EVERY MORNING
Status: DISCONTINUED | OUTPATIENT
Start: 2024-05-29 | End: 2024-05-29 | Stop reason: HOSPADM

## 2024-05-28 RX ORDER — MELATONIN
5000 NIGHTLY
Status: DISCONTINUED | OUTPATIENT
Start: 2024-05-29 | End: 2024-05-29 | Stop reason: HOSPADM

## 2024-05-28 RX ORDER — CYCLOSPORINE 0.5 MG/ML
1 EMULSION OPHTHALMIC 2 TIMES DAILY
Status: DISCONTINUED | OUTPATIENT
Start: 2024-05-29 | End: 2024-05-29 | Stop reason: HOSPADM

## 2024-05-28 RX ORDER — LEVOTHYROXINE SODIUM 0.05 MG/1
100 TABLET ORAL
Status: DISCONTINUED | OUTPATIENT
Start: 2024-05-29 | End: 2024-05-29 | Stop reason: HOSPADM

## 2024-05-28 RX ORDER — POLYETHYLENE GLYCOL 3350 17 G/17G
17 POWDER, FOR SOLUTION ORAL DAILY PRN
Status: DISCONTINUED | OUTPATIENT
Start: 2024-05-28 | End: 2024-05-29 | Stop reason: HOSPADM

## 2024-05-28 RX ORDER — TAMSULOSIN HYDROCHLORIDE 0.4 MG/1
0.4 CAPSULE ORAL DAILY
Status: DISCONTINUED | OUTPATIENT
Start: 2024-05-29 | End: 2024-05-29 | Stop reason: HOSPADM

## 2024-05-28 RX ORDER — SODIUM CHLORIDE 9 MG/ML
125 INJECTION, SOLUTION INTRAVENOUS CONTINUOUS
Status: DISCONTINUED | OUTPATIENT
Start: 2024-05-28 | End: 2024-05-28

## 2024-05-28 RX ORDER — UREA 10 %
1000 LOTION (ML) TOPICAL DAILY
Status: DISCONTINUED | OUTPATIENT
Start: 2024-05-29 | End: 2024-05-29 | Stop reason: HOSPADM

## 2024-05-28 RX ORDER — BISACODYL 10 MG
10 SUPPOSITORY, RECTAL RECTAL DAILY PRN
Status: DISCONTINUED | OUTPATIENT
Start: 2024-05-28 | End: 2024-05-29 | Stop reason: HOSPADM

## 2024-05-28 RX ORDER — UREA 10 %
5 LOTION (ML) TOPICAL NIGHTLY PRN
Status: DISCONTINUED | OUTPATIENT
Start: 2024-05-28 | End: 2024-05-29 | Stop reason: HOSPADM

## 2024-05-28 RX ORDER — NITROGLYCERIN 0.4 MG/1
0.4 TABLET SUBLINGUAL
Status: DISCONTINUED | OUTPATIENT
Start: 2024-05-28 | End: 2024-05-29 | Stop reason: HOSPADM

## 2024-05-28 RX ORDER — SODIUM CHLORIDE 9 MG/ML
125 INJECTION, SOLUTION INTRAVENOUS CONTINUOUS
Status: DISCONTINUED | OUTPATIENT
Start: 2024-05-28 | End: 2024-05-29 | Stop reason: HOSPADM

## 2024-05-28 RX ORDER — ASPIRIN 300 MG/1
300 SUPPOSITORY RECTAL ONCE
Status: COMPLETED | OUTPATIENT
Start: 2024-05-28 | End: 2024-05-28

## 2024-05-28 RX ORDER — AMOXICILLIN 250 MG
2 CAPSULE ORAL 2 TIMES DAILY PRN
Status: DISCONTINUED | OUTPATIENT
Start: 2024-05-28 | End: 2024-05-29 | Stop reason: HOSPADM

## 2024-05-28 RX ADMIN — Medication 10 ML: at 22:03

## 2024-05-28 RX ADMIN — SODIUM CHLORIDE 500 ML: 9 INJECTION, SOLUTION INTRAVENOUS at 19:12

## 2024-05-28 RX ADMIN — SODIUM CHLORIDE 125 ML/HR: 9 INJECTION, SOLUTION INTRAVENOUS at 23:29

## 2024-05-28 RX ADMIN — IOPAMIDOL 150 ML: 755 INJECTION, SOLUTION INTRAVENOUS at 18:45

## 2024-05-28 RX ADMIN — ASPIRIN 300 MG: 300 SUPPOSITORY RECTAL at 20:48

## 2024-05-28 RX ADMIN — SODIUM CHLORIDE 125 ML/HR: 9 INJECTION, SOLUTION INTRAVENOUS at 21:01

## 2024-05-28 NOTE — ED PROVIDER NOTES
" EMERGENCY DEPARTMENT ENCOUNTER    Room Number:  104/1  Date seen:  5/28/2024  PCP: Larry Schofield MD (Tony)  Historian: Patient and EMS      HPI:  Chief Complaint: Trouble with speech  Context: Wilberto Madison is a 83 y.o. male who presents to the ED EMS from his facility for trouble with speech.  Patient has a history of A-fib and is on Xarelto.  He states that he last took his Xarelto approximately 24 hours ago.  The patient states he was at dinner tonight when he noticed he began having trouble getting the words that he wanted to say.  He went and talked with the staff who called EMS.  EMS states that the staff told him that the patient's left facial droop is new but he does have a history of a \"droopy left eyelid\".  On arrival the patient complains of a mild headache but states his speech has improved.  He denies any other complaints.      PAST MEDICAL HISTORY  Active Ambulatory Problems     Diagnosis Date Noted    Persistent atrial fibrillation     Hyperlipidemia     Hypertension     Benign prostatic hyperplasia without lower urinary tract symptoms     Coronary artery disease involving native coronary artery of native heart without angina pectoris 11/03/2021     Resolved Ambulatory Problems     Diagnosis Date Noted    No Resolved Ambulatory Problems     Past Medical History:   Diagnosis Date    Arrhythmia     Atrial fibrillation     Coronary artery disease 1989    ED (erectile dysfunction)     H/O bone density study 12/2015    Myocardial infarction 1989    Paroxysmal atrial fibrillation          REVIEW OF SYSTEMS  All systems reviewed and negative except for those discussed in HPI.       PAST SURGICAL HISTORY  Past Surgical History:   Procedure Laterality Date    ANGIOPLASTY      BACK SURGERY      BIOPSY PROSTATE NEEDLE / PUNCH / INCISIONAL  2000    COLONOSCOPY  2014    NEG    CORONARY ANGIOPLASTY      LAMINECTOMY  2014    LUMBAR         FAMILY HISTORY  Family History   Problem Relation Age of Onset    " Heart disease Father     Heart attack Father     Heart disease Brother         By pass surgery    Stroke Mother          SOCIAL HISTORY  Social History     Socioeconomic History    Marital status:    Tobacco Use    Smoking status: Former     Types: Pipe     Quit date:      Years since quittin.4     Passive exposure: Past    Smokeless tobacco: Never    Tobacco comments:     Never inhaled. Occasional use at desk.   Vaping Use    Vaping status: Never Used   Substance and Sexual Activity    Alcohol use: Yes     Alcohol/week: 6.0 standard drinks of alcohol     Types: 6 Glasses of wine per week     Comment: One glass per day. No increase over timr.  caffiene- denies use    Drug use: No    Sexual activity: Not Currently     Birth control/protection: Vasectomy     Comment: Wife  3 months ago.         ALLERGIES  Patient has no known allergies.      PHYSICAL EXAM  ED Triage Vitals   Temp Heart Rate Resp BP SpO2   24 1823 24 1819 24 1823 05/28/24 18124   98.5 °F (36.9 °C) 79 18 (!) 164/108 98 %      Temp src Heart Rate Source Patient Position BP Location FiO2 (%)   24 1823 -- 24 18124 --   Oral  Lying Left arm        Physical Exam      GENERAL: 83-year-old male in mild distress  HENT: NCAT: nares patent: Neck supple  EYES: no scleral icterus: PERRLA: EOMI  CV: Irregularly irregular in the 70s  RESPIRATORY: normal effort  ABDOMEN: soft, NTND: Bowel sounds positive  MUSCULOSKELETAL: no deformity  NEURO: alert and oriented x 3: See NIHSS  PSYCH:  calm, cooperative  SKIN: warm, dry    Vital signs and nursing notes reviewed.      LAB RESULTS  Recent Results (from the past 24 hour(s))   ECG 12 Lead Stroke Evaluation    Collection Time: 24  6:23 PM   Result Value Ref Range    QT Interval 364 ms    QTC Interval 410 ms   Comprehensive Metabolic Panel    Collection Time: 24  6:25 PM    Specimen: Blood   Result Value Ref Range    Glucose 106 (H) 65 -  99 mg/dL    BUN 16 8 - 23 mg/dL    Creatinine 1.11 0.76 - 1.27 mg/dL    Sodium 133 (L) 136 - 145 mmol/L    Potassium 3.9 3.5 - 5.2 mmol/L    Chloride 95 (L) 98 - 107 mmol/L    CO2 29.0 22.0 - 29.0 mmol/L    Calcium 8.6 8.6 - 10.5 mg/dL    Total Protein 6.1 6.0 - 8.5 g/dL    Albumin 3.8 3.5 - 5.2 g/dL    ALT (SGPT) 18 1 - 41 U/L    AST (SGOT) 16 1 - 40 U/L    Alkaline Phosphatase 61 39 - 117 U/L    Total Bilirubin 0.5 0.0 - 1.2 mg/dL    Globulin 2.3 gm/dL    A/G Ratio 1.7 g/dL    BUN/Creatinine Ratio 14.4 7.0 - 25.0    Anion Gap 9.0 5.0 - 15.0 mmol/L    eGFR 65.1 >60.0 mL/min/1.73   Protime-INR    Collection Time: 05/28/24  6:25 PM    Specimen: Blood   Result Value Ref Range    Protime 16.0 (H) 11.7 - 14.2 Seconds    INR 1.26 (H) 0.90 - 1.10   High Sensitivity Troponin T    Collection Time: 05/28/24  6:25 PM    Specimen: Blood   Result Value Ref Range    HS Troponin T 16 <22 ng/L   CBC Auto Differential    Collection Time: 05/28/24  6:25 PM    Specimen: Blood   Result Value Ref Range    WBC 6.90 3.40 - 10.80 10*3/mm3    RBC 4.67 4.14 - 5.80 10*6/mm3    Hemoglobin 14.8 13.0 - 17.7 g/dL    Hematocrit 43.8 37.5 - 51.0 %    MCV 93.8 79.0 - 97.0 fL    MCH 31.7 26.6 - 33.0 pg    MCHC 33.8 31.5 - 35.7 g/dL    RDW 12.5 12.3 - 15.4 %    RDW-SD 42.4 37.0 - 54.0 fl    MPV 8.9 6.0 - 12.0 fL    Platelets 159 140 - 450 10*3/mm3    Neutrophil % 74.9 42.7 - 76.0 %    Lymphocyte % 11.9 (L) 19.6 - 45.3 %    Monocyte % 9.3 5.0 - 12.0 %    Eosinophil % 2.8 0.3 - 6.2 %    Basophil % 0.7 0.0 - 1.5 %    Immature Grans % 0.4 0.0 - 0.5 %    Neutrophils, Absolute 5.17 1.70 - 7.00 10*3/mm3    Lymphocytes, Absolute 0.82 0.70 - 3.10 10*3/mm3    Monocytes, Absolute 0.64 0.10 - 0.90 10*3/mm3    Eosinophils, Absolute 0.19 0.00 - 0.40 10*3/mm3    Basophils, Absolute 0.05 0.00 - 0.20 10*3/mm3    Immature Grans, Absolute 0.03 0.00 - 0.05 10*3/mm3    nRBC 0.0 0.0 - 0.2 /100 WBC   High Sensitivity Troponin T 2Hr    Collection Time: 05/28/24  8:47 PM     "Specimen: Blood   Result Value Ref Range    HS Troponin T 11 <22 ng/L    Troponin T Delta -5 (L) >=-4 - <+4 ng/L       Ordered the above labs and reviewed the results.        RADIOLOGY  CT Angiogram Head, CT Angiogram Neck, CT CEREBRAL PERFUSION WITH & WITHOUT CONTRAST    Result Date: 5/28/2024  CT ANGIOGRAM HEAD-, CT ANGIOGRAM NECK-, CT CEREBRAL PERFUSION W WO CONTRAST-  CT ANGIOGRAM HEAD AND NECK AND CT PERFUSION STUDY  CLINICAL HISTORY: Difficulty with speech. \"Not a team D.\"  Radiation dose reduction techniques were utilized, including automated exposure control and exposure modulation based on body size. CT scan of the head was obtained with 3 mm axial images without the use of IV contrast. The patient underwent a CT perfusion study with a dynamic bolus of IV contrast. Standard perfusion maps were constructed. The patient then underwent a CT angiogram of the head and neck with 1 mm axial images following the administration of IV contrast. Sagittal, coronal, and 3-dimensional reconstructed images were obtained.  Percent stenosis was assessed in accordance with NASCET criteria.  AI analysis of LVO was utilized.  FINDINGS:  NONCONTRAST HEAD CT: On the noncontrast head CT, gray-white matter differentiation is preserved, and no acute hemorrhage or hydrocephalus is identified. Mild periventricular hypodensities suggest chronic small vessel ischemic change in a patient this age.  No enhancing lesion in the brain. A 7 mm densely calcified extra-axial focus adjacent to the left parietal lobe on sagittal image 56 may be an exostosis of the skull or calcified meningioma, with no apparent reactive parenchymal change.   CT PERFUSION STUDY:  No CBF (under 30%) deficit. Estimated 41 cc perfusion abnormality is demonstrated where the T MAX is greater than 6 seconds, with affected areas predominantly including bilateral cerebellum, left occipital lobe. The calculated mismatch volume was 41 cc.  CTA HEAD and neck: The CT " angiogram of the head and neck demonstrates no hemodynamically significant focal stenosis, aneurysm, or dissection in the cervical carotid or vertebral arteries, or in the arteries at the base of the brain. Scattered calcifications are seen in the bilateral carotid arteries without high-grade stenosis (0-49% stenosis).  Facet and uncovertebral joint hypertrophy contribute to neuroforaminal narrowing, more prominent on the right at C4/5, and on the left at C3/4, C5/6, C6/7.  The ascending aorta is dilated, 3.9 cm. A 7 mm short axis right paratracheal lymph node is seen on axial image 82.        1. No completed acute infarct is identified, but perfusion abnormality suggesting threatened acute infarct is present in the bilateral cerebellum and left occipital lobe. MRI correlation is suggested as indicated.  2. No arterial cutoff or high-grade arterial stenosis identified.  3. No acute intracranial hemorrhage or hydrocephalus. No enhancing lesions of brain.       This report was finalized on 5/28/2024 7:49 PM by Dr. Jeffrey Kaplan M.D on Workstation: [id number]       Ordered the above noted radiological studies. Reviewed by me in PACS.            PROCEDURES  Critical Care    Performed by: Jassi Nagy MD  Authorized by: Jassi Nagy MD    Critical care provider statement:     Critical care time (minutes):  33    Critical care time was exclusive of:  Separately billable procedures and treating other patients    Critical care was necessary to treat or prevent imminent or life-threatening deterioration of the following conditions:  CNS failure or compromise    Critical care was time spent personally by me on the following activities:  Development of treatment plan with patient or surrogate, discussions with consultants, discussions with primary provider, evaluation of patient's response to treatment, examination of patient, obtaining history from patient or surrogate, ordering and performing treatments and  interventions, ordering and review of laboratory studies, ordering and review of radiographic studies, pulse oximetry, re-evaluation of patient's condition and review of old charts    I assumed direction of critical care for this patient from another provider in my specialty: no      Care discussed with: admitting provider              MEDICATIONS GIVEN IN ER  Medications   sodium chloride 0.9 % flush 10 mL (has no administration in time range)   sodium chloride 0.9 % infusion (has no administration in time range)   sodium chloride 0.9 % flush 10 mL (has no administration in time range)   sodium chloride 0.9 % flush 10 mL (has no administration in time range)   sodium chloride 0.9 % infusion 40 mL (has no administration in time range)   nitroglycerin (NITROSTAT) SL tablet 0.4 mg (has no administration in time range)   sodium chloride 0.9 % infusion (125 mL/hr Intravenous New Bag 5/28/24 2101)   sennosides-docusate (PERICOLACE) 8.6-50 MG per tablet 2 tablet (has no administration in time range)     And   polyethylene glycol (MIRALAX) packet 17 g (has no administration in time range)     And   bisacodyl (DULCOLAX) EC tablet 5 mg (has no administration in time range)     And   bisacodyl (DULCOLAX) suppository 10 mg (has no administration in time range)   sodium chloride 0.9 % bolus 500 mL (0 mL Intravenous Stopped 5/28/24 2101)   iopamidol (ISOVUE-370) 76 % injection 150 mL (150 mL Intravenous Given by Other 5/28/24 1845)   aspirin suppository 300 mg (300 mg Rectal Given 5/28/24 2048)             MEDICAL DECISION MAKING, PROGRESS, and CONSULTS    All labs have been independently reviewed by me.  All radiology studies have been reviewed by me and I have also reviewed the radiology report.   EKG's independently viewed and interpreted by me.  Discussion below represents my analysis of pertinent findings related to patient's condition, differential diagnosis, treatment plan and final disposition.      Additional  sources:  - Discussed/ obtained information from independent historians: Upon the daughter's arrival she states the patient is at baseline but does think he has a mild left facial droop    - External (non-ED) record review: I reviewed the patient's office visit with Dr. Ratliff from 5/14/2024 where he was seen for his persistent A-fib, coronary artery disease and hypertension.    - Chronic or social conditions impacting care: Patient lives in assisted living facility    - Shared decision making: After shared decision-making discussion between myself, the neurologist, the patient and his daughter we agree he be best served by admission to the hospital      Orders placed during this visit:  Orders Placed This Encounter   Procedures    Critical Care    CT Angiogram Head    CT Angiogram Neck    CT CEREBRAL PERFUSION WITH & WITHOUT CONTRAST    MRI Brain With & Without Contrast    Comprehensive Metabolic Panel    Protime-INR    High Sensitivity Troponin T    CBC Auto Differential    High Sensitivity Troponin T 2Hr    Basic Metabolic Panel    CBC (No Diff)    Magnesium    Lipid Panel    NPO Diet NPO Type: Strict NPO    Vital Signs    Intake & Output    Weigh Patient    Oral Care    Place Sequential Compression Device    Maintain Sequential Compression Device    Maintain IV Access    Telemetry - Place Orders & Notify Provider of Results When Patient Experiences Acute Chest Pain, Dysrhythmia or Respiratory Distress    May Be Off Telemetry for Tests    Up With Assistance    SLP Consult: Eval & Treat RN Dysphagia Screen Failed    ECG 12 Lead Stroke Evaluation    ECG 12 Lead Stroke Evaluation    Insert Peripheral IV    Insert Peripheral IV    Initiate ED Observation Status    CBC & Differential         Differential diagnosis:  My differential diagnosis includes but is not limited to stroke, encephalopathy, toxic / metabolic, hypoglycemia, toxin-induced, psychogenic, medication-induced, or infectious.      Independent  interpretation of labs, radiology studies, and discussions with consultants:  ED Course as of 05/28/24 2145   Tue May 28, 2024   1823 The patient presents for a stroke rule out.  He has a history of A-fib and is on Xarelto with his last dose approximately 24 hours ago.  Currently the patient has a mild left facial droop but no other symptoms given him an NIHSS of 1.  I discussed the case with Dr. Anthony from the stroke team.  We will not perform 18 the or thrombolytics with his non-debilitating symptoms and chronic anticoagulation but will obtain CTA and CTP stat. [GP]   1833 NIHSS:  0-->Alert: keenly responsive  0-->Answers both questions correctly  0-->Performs both tasks correctly  0=normal  0=No visual loss  1=Minor paralysis (flattened nasolabial fold, asymmetric on smiling)  0-->No drift: limb holds 90 (or 45) degrees for full 10 secs  0-->No drift: limb holds 90 (or 45) degrees for full 10 secs  0-->No drift: limb holds 90 (or 45) degrees for full 10 secs  0-->No drift: limb holds 90 (or 45) degrees for full 10 secs  0=Absent  0=Normal; no sensory loss  0=No aphasia, normal  0=Normal  0=No abnormality  Total score: 1 [GP]   1833 I have spoken with CT and they will take the patient back next.  We have an extra green top so they can run an i-STAT creatinine.  I will order a IV fluid bolus [GP]   1834 EKG    EKG time: 1823  Rhythm/Rate: A-fib with a rate of 76  No Acute Ischemia  Non-Specific ST-T changes    Similar compared to prior on 2/15/2024    Interpreted Contemporaneously by me.  Independently viewed by me     [GP]   1954 The patient's CTA shows no high-grade obstruction.  His CTP showed no completed infarct but there is question for bilateral cerebellar and left occipital regions at threat.  I will consult the stroke MD again. [GP]   1957 On repeat evaluation the patient is alert and oriented x 4.  His daughter is now here who states that his at his normal baseline mental status.  She states she does  believe he may have a mild left facial droop.  His blood pressure is currently 160/100. [GP]   2001 I discussed the case with Dr. Meyer from the stroke team.  We reviewed the patient's imaging.  He has asked me to give the patient an aspirin and IV fluids and admit him to the observation unit for MRI of the brain and further evaluation and care.  With the patient's history of chronic anticoagulation and no large vessel occlusion the patient is not a thrombolytic or interventional candidate. [GP]   2036 I discussed the case with Lionel White from the observation unit.  She is aware of the patient's symptoms, workup and my consultation with the stroke team who requested an aspirin, IV fluids, MRI brain and admission to the observation unit. [GP]      ED Course User Index  [GP] Jassi Nagy MD               DIAGNOSIS  Final diagnoses:   TIA (transient ischemic attack)   Chronic a-fib   Chronic anticoagulation         DISPOSITION  ADMISSION    Discussed treatment plan and reason for admission with pt/family and admitting physician.  Pt/family voiced understanding of the plan for admission for further testing/treatment as needed.            Latest Documented Vital Signs:  As of 21:45 EDT  BP- (!) 144/104 HR- 59 Temp- 98.5 °F (36.9 °C) (Oral) O2 sat- 95%--      --------------------  Please note that portions of this were completed with a voice recognition program.       Note Disclaimer: At [facility], we believe that sharing information builds trust and better relationships. You are receiving this note because you are receiving care at [facility] or recently visited. It is possible you will see health information before a provider has talked with you about it. This kind of information can be easy to misunderstand. To help you fully understand what it means for your health, we urge you to discuss this note with your provider.             Jassi Nagy MD  05/28/24 2970

## 2024-05-29 ENCOUNTER — READMISSION MANAGEMENT (OUTPATIENT)
Dept: CALL CENTER | Facility: HOSPITAL | Age: 84
End: 2024-05-29
Payer: MEDICARE

## 2024-05-29 VITALS
SYSTOLIC BLOOD PRESSURE: 136 MMHG | HEIGHT: 70 IN | RESPIRATION RATE: 17 BRPM | OXYGEN SATURATION: 98 % | WEIGHT: 194.4 LBS | DIASTOLIC BLOOD PRESSURE: 91 MMHG | TEMPERATURE: 98 F | HEART RATE: 52 BPM | BODY MASS INDEX: 27.83 KG/M2

## 2024-05-29 LAB
ANION GAP SERPL CALCULATED.3IONS-SCNC: 10.2 MMOL/L (ref 5–15)
BUN SERPL-MCNC: 12 MG/DL (ref 8–23)
BUN/CREAT SERPL: 12.4 (ref 7–25)
CALCIUM SPEC-SCNC: 8.6 MG/DL (ref 8.6–10.5)
CHLORIDE SERPL-SCNC: 102 MMOL/L (ref 98–107)
CHOLEST SERPL-MCNC: 114 MG/DL (ref 0–200)
CO2 SERPL-SCNC: 25.8 MMOL/L (ref 22–29)
CREAT BLDA-MCNC: 1.2 MG/DL (ref 0.6–1.3)
CREAT SERPL-MCNC: 0.97 MG/DL (ref 0.76–1.27)
DEPRECATED RDW RBC AUTO: 42.5 FL (ref 37–54)
EGFRCR SERPLBLD CKD-EPI 2021: 76.5 ML/MIN/1.73
ERYTHROCYTE [DISTWIDTH] IN BLOOD BY AUTOMATED COUNT: 12.6 % (ref 12.3–15.4)
GLUCOSE SERPL-MCNC: 98 MG/DL (ref 65–99)
HCT VFR BLD AUTO: 45.1 % (ref 37.5–51)
HDLC SERPL-MCNC: 43 MG/DL (ref 40–60)
HGB BLD-MCNC: 15.4 G/DL (ref 13–17.7)
LDLC SERPL CALC-MCNC: 58 MG/DL (ref 0–100)
LDLC/HDLC SERPL: 1.38 {RATIO}
MAGNESIUM SERPL-MCNC: 1.8 MG/DL (ref 1.6–2.4)
MCH RBC QN AUTO: 31.8 PG (ref 26.6–33)
MCHC RBC AUTO-ENTMCNC: 34.1 G/DL (ref 31.5–35.7)
MCV RBC AUTO: 93 FL (ref 79–97)
PLATELET # BLD AUTO: 161 10*3/MM3 (ref 140–450)
PMV BLD AUTO: 9.5 FL (ref 6–12)
POTASSIUM SERPL-SCNC: 3.4 MMOL/L (ref 3.5–5.2)
POTASSIUM SERPL-SCNC: 4 MMOL/L (ref 3.5–5.2)
QT INTERVAL: 364 MS
QT INTERVAL: 488 MS
QTC INTERVAL: 410 MS
QTC INTERVAL: 504 MS
RBC # BLD AUTO: 4.85 10*6/MM3 (ref 4.14–5.8)
SODIUM SERPL-SCNC: 138 MMOL/L (ref 136–145)
TRIGL SERPL-MCNC: 58 MG/DL (ref 0–150)
VLDLC SERPL-MCNC: 13 MG/DL (ref 5–40)
WBC NRBC COR # BLD AUTO: 6.3 10*3/MM3 (ref 3.4–10.8)

## 2024-05-29 PROCEDURE — 92610 EVALUATE SWALLOWING FUNCTION: CPT

## 2024-05-29 PROCEDURE — 80061 LIPID PANEL: CPT

## 2024-05-29 PROCEDURE — 99214 OFFICE O/P EST MOD 30 MIN: CPT | Performed by: STUDENT IN AN ORGANIZED HEALTH CARE EDUCATION/TRAINING PROGRAM

## 2024-05-29 PROCEDURE — 83735 ASSAY OF MAGNESIUM: CPT

## 2024-05-29 PROCEDURE — 85027 COMPLETE CBC AUTOMATED: CPT

## 2024-05-29 PROCEDURE — 84132 ASSAY OF SERUM POTASSIUM: CPT

## 2024-05-29 PROCEDURE — G0378 HOSPITAL OBSERVATION PER HR: HCPCS

## 2024-05-29 PROCEDURE — 25810000003 SODIUM CHLORIDE 0.9 % SOLUTION

## 2024-05-29 PROCEDURE — 93010 ELECTROCARDIOGRAM REPORT: CPT | Performed by: INTERNAL MEDICINE

## 2024-05-29 PROCEDURE — 80048 BASIC METABOLIC PNL TOTAL CA: CPT

## 2024-05-29 PROCEDURE — 0 GADOBENATE DIMEGLUMINE 529 MG/ML SOLUTION: Performed by: EMERGENCY MEDICINE

## 2024-05-29 PROCEDURE — 96361 HYDRATE IV INFUSION ADD-ON: CPT

## 2024-05-29 PROCEDURE — 93005 ELECTROCARDIOGRAM TRACING: CPT

## 2024-05-29 PROCEDURE — A9577 INJ MULTIHANCE: HCPCS | Performed by: EMERGENCY MEDICINE

## 2024-05-29 RX ORDER — AMLODIPINE BESYLATE 5 MG/1
5 TABLET ORAL DAILY PRN
Qty: 30 TABLET | Refills: 0 | Status: SHIPPED | OUTPATIENT
Start: 2024-05-29

## 2024-05-29 RX ORDER — AMLODIPINE BESYLATE 5 MG/1
5 TABLET ORAL ONCE
Status: COMPLETED | OUTPATIENT
Start: 2024-05-29 | End: 2024-05-29

## 2024-05-29 RX ORDER — POTASSIUM CHLORIDE 750 MG/1
40 TABLET, FILM COATED, EXTENDED RELEASE ORAL EVERY 4 HOURS
Status: COMPLETED | OUTPATIENT
Start: 2024-05-29 | End: 2024-05-29

## 2024-05-29 RX ADMIN — SOTALOL HYDROCHLORIDE 80 MG: 80 TABLET ORAL at 09:47

## 2024-05-29 RX ADMIN — SODIUM CHLORIDE 125 ML/HR: 9 INJECTION, SOLUTION INTRAVENOUS at 05:59

## 2024-05-29 RX ADMIN — RIVAROXABAN 20 MG: 20 TABLET, FILM COATED ORAL at 00:28

## 2024-05-29 RX ADMIN — LEVOTHYROXINE SODIUM 100 MCG: 50 TABLET ORAL at 06:04

## 2024-05-29 RX ADMIN — POTASSIUM CHLORIDE 40 MEQ: 750 TABLET, EXTENDED RELEASE ORAL at 12:44

## 2024-05-29 RX ADMIN — Medication 1000 MCG: at 08:52

## 2024-05-29 RX ADMIN — SODIUM CHLORIDE 125 ML/HR: 9 INJECTION, SOLUTION INTRAVENOUS at 16:15

## 2024-05-29 RX ADMIN — SOTALOL HYDROCHLORIDE 80 MG: 80 TABLET ORAL at 00:16

## 2024-05-29 RX ADMIN — TAMSULOSIN HYDROCHLORIDE 0.4 MG: 0.4 CAPSULE ORAL at 09:47

## 2024-05-29 RX ADMIN — AMLODIPINE BESYLATE 5 MG: 5 TABLET ORAL at 15:05

## 2024-05-29 RX ADMIN — HYDROCHLOROTHIAZIDE 12.5 MG: 12.5 TABLET ORAL at 07:33

## 2024-05-29 RX ADMIN — GADOBENATE DIMEGLUMINE 18 ML: 529 INJECTION, SOLUTION INTRAVENOUS at 01:07

## 2024-05-29 RX ADMIN — POTASSIUM CHLORIDE 40 MEQ: 750 TABLET, EXTENDED RELEASE ORAL at 08:52

## 2024-05-29 RX ADMIN — Medication 5000 UNITS: at 00:16

## 2024-05-29 RX ADMIN — ATORVASTATIN CALCIUM 10 MG: 10 TABLET, FILM COATED ORAL at 00:16

## 2024-05-29 RX ADMIN — Medication 10 ML: at 08:54

## 2024-05-29 NOTE — CONSULTS
Pt awake, daughter at bedside, chp visit welcomed. Chp made Pt aware of spiritual/emotional support services available. Pt requested prayer. Chp prayed with Pt.     Chaplains remain available.    No gynecomastia

## 2024-05-29 NOTE — PROGRESS NOTES
"CONNIE OWENS Attestation Note    I supervised care provided by the midlevel provider.    The JOE and I have discussed this patient's history, physical exam, and treatment plan. I have reviewed the documentation and personally had a face to face interaction with the patient  I affirm the documentation and agree with the treatment and plan. I provided a substantive portion of the care of this patient.  I personally performed the physical exam, in its entirety.  My personal findings are documented in below:    History:  Patient with history of atrial fibrillation on Xarelto for anticoagulation presented to ED this evening because of an episode of speech pattern disturbance that lasted several minutes and then resolved.  Patient was sitting down at the dining room table with some friends at his halfway community and he had some difficulties \"getting his words out for several minutes.\"  He left at the dining table and went to a nurse/staff station for assistance.  By the time he arrived at that station he was able to communicate more clearly.  The staff at his facility was somewhat concerned that he may have some left-sided facial droop and therefore they advised him to come to the hospital for further evaluation via EMS.  On arrival, patient says he is feeling much better.  He feels that his speech has returned to normal.  He did not experience any weakness or numbness of his extremities or face.  He did not have any vision changes.  He says that he has had a previous surgery to his left eyelid which caused some asymmetry in that part of the face but he has not noticed any problems with his left lower face causing any drooping or weakness recently.  He does report of a mild headache at this time but otherwise feels well.  Patient does note that his PCP recently had him stop taking his amlodipine medication within the past month because his blood pressure values have been running somewhat low.    Physical Exam:  General: " No acute distress.  HENT: NCAT, PERRL, Nares patent.  Eyes: no scleral icterus.  EOMI.  There are apparent chronic changes of the left eyelids  Neck: trachea midline, no ROM limitations.  CV: Pink warm and well-perfused throughout  Respiratory: No distress or increased work of breathing  Abdomen: soft, no focal tenderness or rigidity  Musculoskeletal: no deformity.  Neuro: alert, moves all extremities, follows commands.  Speech is clear and articulate.  No significant facial droop apparent.  Ambulates independently to the bathroom and back with normal gait.  Skin: warm, dry.    Assessment and Plan:  Speech change/TIA: MRI brain.  Neurology consult.    History of hypertension: Monitor blood pressure values carefully.  Continue home medications.

## 2024-05-29 NOTE — CONSULTS
"Neurology Consult Note    Consult Date: 5/29/2024    Referring MD: Jassi Nagy MD    Reason for Consult I have been asked to see the patient in neurological consultation to render advice and opinion regarding transient ischemic attack.    Wilberto Madison is a 83 y.o. right-handed white male with known diagnosis of hypertension, mixed hyperlipidemia, CAD/myocardial infarction in 1989, atrial fibrillation on Xarelto 20 mg daily compliant presented for evaluation of transient aphasia that happened last night at his nursing home patient stated that he was awake and alert, he knows what he wants to say but it was hard to get the words out symptoms significantly improved by the time he arrived to the hospital.  He had an MRI brain which showed no acute stroke.  CT angio head and neck that showed no significant stenosis or occlusion on his intracranial or extracranial blood vessels.  She is on Lipitor 10 mg daily prior to arrival and Xarelto 20 mg daily.  He socially drinks, denies smoking or drug illicit.    Of note, as per patient blood pressure medications recently adjusted by PCP last month by stopping his amlodipine.    Past Medical History:   Diagnosis Date    Arrhythmia     Atrial fibrillation     Benign prostatic hyperplasia without lower urinary tract symptoms     Coronary artery disease 1989    ED (erectile dysfunction)     H/O bone density study 12/2015    [facility]    Hyperlipidemia     Hypertension     Myocardial infarction 1989    Was called “aborted “ attack    Paroxysmal atrial fibrillation        Exam  /86 (BP Location: Right arm, Patient Position: Lying)   Pulse 52   Temp 97.9 °F (36.6 °C) (Oral)   Resp 18   Ht 177.8 cm (70\")   Wt 88.2 kg (194 lb 6.4 oz)   SpO2 96%   BMI 27.89 kg/m²   Gen: NAD, vitals reviewed  MS: oriented x3, recent/remote memory intact, normal attention/concentration, language intact, no neglect.  CN: visual acuity grossly normal, PERRL, EOMI, no facial droop, no " dysarthria  Motor: 5/5 throughout upper and lower extremities, normal tone  Skin exam: Normal to light touch throughout  Coordination: Finger-nose-finger and heel-to-shin normal bilaterally    NIH Stroke Scale :    (0_) 1a. Level of consciousness (LOC): 0=alert;1=arousable by minor stimulation;2=obtunded or needs strong stimulation to attend;3=unresponsive or reflex responses only  (0_) 1b. LOC Questions: 0=answers both;1=answers one;2=answers neither  (0_) 1c. LOC Commands: 0=performs both tasks;1=performs one task;2=performs neither task  (0_) 2. Best Gaze: 0=normal;1=partial gaze palsy;2=forced deviation or total gaze paresis  (0_) 3. Visual: 0=normal;1=partial hemianopia;2=complete hemianopia;3=blind  (0_) 4. Facial palsy: 0=normal;1=minor paresis;2=partial paralysis;3=complete paralysis  FOR 5 AND 6 BELOW: 0=normal;1=drifts but maintains in air;2=unable to maintain in air;3=moves but unable to lift against gravity;4=no movement  (0_)0 (_)1 (_)2 (_)3 (_)4 (_)NA 5a. Motor arm-left  (0_)0 (_)1 (_)2 (_)3 (_)4 (_)NA 5b. Motor arm-right  (0_)0 (_)1 (_)2 (_)3 (_)4 (_)NA 6a. Motor leg-left  (0_)0 (_)1 (_)2 (_)3 (_)4 (_)NA 6ba. Motor leg-right  (0_) 7. Limb ataxia:0=absent;1=unilateral;2=bilateral; NA=unable to test  (0_) 8. Sensory: 0=normal;1=mild-moderate loss;2-severe or total loss  (0_) 9. Best language: 0=normal;1=mild-moderate aphasia, some deficits apparent but able to communicate;2=severe aphasia, fragmentary expression only, unable to communicate well;3=global aphasia, mute and no comprehension  (0_)10. Dysarthria: 0=normal;1=mild-moderate, slurs some words;2=severe, speech mostly unintelligible; NA=unable to test (e.g.,intubation)  (0_)11. Extinction/Inattention: 0=normal;1=visual,tactile,auditory or other extinction to bilateral simultaneous stimulation, but no severe neglect;2=answers neither      NIH Score: Complete  0    DATA:    Lab Results   Component Value Date    GLUCOSE 98 05/29/2024    CALCIUM 8.6  05/29/2024     05/29/2024    K 3.4 (L) 05/29/2024    CO2 25.8 05/29/2024     05/29/2024    BUN 12 05/29/2024    CREATININE 0.97 05/29/2024    EGFRIFAFRI 83 12/07/2021    EGFRIFNONA 72 12/07/2021    BCR 12.4 05/29/2024    ANIONGAP 10.2 05/29/2024     Lab Results   Component Value Date    WBC 6.30 05/29/2024    HGB 15.4 05/29/2024    HCT 45.1 05/29/2024    MCV 93.0 05/29/2024     05/29/2024       Lab review: Above labs reviewed, LDL 58, A1c 6.0, TSH normal    Imaging review: Personally reviewed his MRI brain which showed no acute stroke, CT angio head and neck with no significant stenosis or occlusion in the intracranial extracranial blood vessels.    Diagnoses:  -Transient expressive aphasia likely transient ischemic attack  -Known diagnosis of paroxysmal atrial fibrillation anticoagulated with Xarelto  -Essential hypertension  -Mixed hyperlipidemia    Pre-stroke MRS: 0  NIHSS: 0      PLAN:   -Switch Xarelto to Eliquis 5 mg twice daily.  First dose to be given at 8 PM today then from tomorrow twice daily 8 AM and 8 PM.  His last dose of Xarelto was early this morning at 12:30 AM  -No need for aspirin from stroke standpoint, patient not on aspirin prior to arrival  -Continue Lipitor 10 mg daily  -Blood pressure goal less than 140/90, blood pressure medication adjustment as per the primary team  -Advised the patient to monitor blood pressure at home twice daily for the next 10 to 14 days and bring the numbers to his PCP to adjust blood pressure medications if needed to be at the goal  -Goal LDL less than 70 currently at 58, continue Lipitor 10 mg daily even though within the goal to decrease inflammation and risk of strokes  -His blood pressure still slightly above the goal, once blood pressure controlled okay to discharge from stroke standpoint.  -Follow-up with stroke clinic in 2 to 3 months, MA texted to arrange follow-up    Discussed with observation unit provider, patient and daughter at the  bedside and answered all their questions.    Medical Decision Making for this neurology consultation consists of the following:  Review of previous chart, including H/P, provider and nursing notes as applicable.  Review of medications and vital signs.  Review of previous labs, neuroimaging, and additional relevant diagnostics.   Interpretation of laboratory, imaging, and other diagnostic results  Discussion with other providers and family   Total face-to-face/floor time: 30-75 minutes.

## 2024-05-29 NOTE — PROGRESS NOTES
"CONNIE OWENS Attestation Note    I supervised care provided by the midlevel provider.    The JOE and I have discussed this patient's history, physical exam, and treatment plan. I have reviewed the documentation and personally had a face to face interaction with the patient  I affirm the documentation and agree with the treatment and plan. I provided a substantive portion of the care of this patient.  I personally performed the physical exam, in its entirety.  My personal findings are documented in below:    History:  Patient with history of atrial fibrillation on Xarelto for anticoagulation presented to ED this evening because of an episode of speech pattern disturbance that lasted several minutes and then resolved.  Patient was sitting down at the dining room table with some friends at his [facility] community and he had some difficulties \"getting his words out for several minutes.\"  He left at the dining table and went to a nurse/staff station for assistance.  By the time he arrived at that station he was able to communicate more clearly.  The staff at his facility was somewhat concerned that he may have some left-sided facial droop and therefore they advised him to come to the hospital for further evaluation via EMS.  On arrival, patient says he is feeling much better.  He feels that his speech has returned to normal.  He did not experience any weakness or numbness of his extremities or face.  He did not have any vision changes.    Patient has no new complaints this morning.  Says that he has not experienced any repeated episodes of dizziness or speech changes.     Physical Exam:  General: No acute distress.  HENT: NCAT, PERRL, Nares patent.  Eyes: no scleral icterus.  EOMI.  There are apparent chronic changes of the left eyelids  Neck: trachea midline, no ROM limitations.  CV: Pink warm and well-perfused throughout  Respiratory: No distress or increased work of breathing  Abdomen: soft, no focal tenderness or " rigidity  Musculoskeletal: no deformity.  Neuro: alert, moves all extremities, follows commands.  Speech is clear and articulate.  No significant facial droop apparent.  Ambulates independently to the bathroom and back with normal gait.  Skin: warm, dry.    Assessment and Plan:  Dysarthria/TIA: Symptoms remain resolved.  MRI brain is negative for acute infarct.  Neurology consulted.  Possible echo study today if recommended by neurology.

## 2024-05-29 NOTE — PROGRESS NOTES
ED OBSERVATION PROGRESS/DISCHARGE SUMMARY    Date of Admission: 5/28/2024   LOS: 0 days   PCP: Larry Schofield MD (Tony)    Final Diagnosis TIA      Subjective     Hospital Outcome:     Patient is a pleasant afebrile ambulatory 83-year-old  gentleman admitted to the ED observation unit for speech changes and concern regarding TIA.  This morning he reports he is feeling well, he has had no recurrence of symptoms overnight.  All patient's CT head neck and perfusion studies yesterday showed concern for threatened acute infarct in the bilateral cerebellum and left occipital lobe his MRI of his brain with and without contrast did not reveal any acute intracranial abnormalities.    Echocardiogram 5/9/2024 showed EF of 60% and mild dilation of his aortic root, his echo 11/3/2022 did not test for saline test nor did his echo on 5/9/2024.    This morning patient reports he is anxious for discharge back to his independent living facility.  Seen and evaluated by neurology who recommend changing patient from Xarelto to Eliquis with tight BP control.  Will encourage patient to use previously prescribed amlodipine as needed for SBP greater than 140.  He is otherwise okay to follow-up in stroke clinic with neurology in 2 to 3 months.    ROS:  General: no fevers, chills  Respiratory: no cough, dyspnea  Cardiovascular: no chest pain, palpitations  Abdomen: No abdominal pain, nausea, vomiting, or diarrhea  Neurologic: No focal weakness    Objective   Physical Exam:  I have reviewed the vital signs.  Temp:  [97.5 °F (36.4 °C)-98.5 °F (36.9 °C)] 98 °F (36.7 °C)  Heart Rate:  [52-94] 52  Resp:  [17-18] 17  BP: (126-167)/() 136/91  General Appearance:    Alert, cooperative, no distress, elderly  Head:    Normocephalic, atraumatic  Eyes:    Sclerae anicteric  Neck:   Supple, no mass  Lungs: Clear to auscultation bilaterally, respirations unlabored  Heart: Regular rate and rhythm, S1 and S2 normal, no murmur, rub or  gallop  Abdomen:  Soft, non-tender, bowel sounds active, nondistended  Extremities: No clubbing, cyanosis, or edema to lower extremities  Pulses:  2+ and symmetric in distal lower extremities  Skin: No rashes   Neurologic: Oriented x3, Normal strength to extremities    Results Review:    I have reviewed the labs, radiology results and diagnostic studies.    Results from last 7 days   Lab Units 05/29/24  0455   WBC 10*3/mm3 6.30   HEMOGLOBIN g/dL 15.4   HEMATOCRIT % 45.1   PLATELETS 10*3/mm3 161     Results from last 7 days   Lab Units 05/29/24  1711 05/29/24  0455 05/28/24  1842 05/28/24  1825   SODIUM mmol/L  --  138  --  133*   POTASSIUM mmol/L 4.0 3.4*  --  3.9   CHLORIDE mmol/L  --  102  --  95*   CO2 mmol/L  --  25.8  --  29.0   BUN mg/dL  --  12  --  16   CREATININE mg/dL  --  0.97 1.20 1.11   CALCIUM mg/dL  --  8.6  --  8.6   BILIRUBIN mg/dL  --   --   --  0.5   ALK PHOS U/L  --   --   --  61   ALT (SGPT) U/L  --   --   --  18   AST (SGOT) U/L  --   --   --  16   GLUCOSE mg/dL  --  98  --  106*     Imaging Results (Last 24 Hours)       Procedure Component Value Units Date/Time    MRI Brain With & Without Contrast [[id number]] Collected: 05/29/24 0500     Updated: 05/29/24 0510    Narrative:      BRAIN MRI WITH AND WITHOUT CONTRAST     HISTORY: TIA; G45.9-Transient cerebral ischemic attack, unspecified;  I48.20-Chronic atrial fibrillation, unspecified; Z79.01-Long term  (current) use of anticoagulants     COMPARISON: May 28, 2024.      FINDINGS:  Multiplanar images of the head were obtained without and with  gadolinium. No areas of restricted diffusion are seen to suggest acute  infarct. There is atrophy. There is periventricular and deep white  matter microangiopathic change. Intracranial flow voids appear intact.  No abnormalities are seen on susceptibility weighted imaging. No  abnormality is seen on postcontrast imaging. There is some mucosal  thickening within the ethmoid sinuses. There is also some  "minimal  mucosal thickening within the left frontal sinus. There are trace  bilateral mastoid effusions.        Impression:      1. No acute intracranial abnormality. .  No abnormal enhancement        This report was finalized on 5/29/2024 5:07 AM by Dr. Cherry Rosales M.D on Workstation: [id number]       CT Angiogram Head [[id number]] Collected: 05/28/24 1933     Updated: 05/28/24 1952    Narrative:      CT ANGIOGRAM HEAD-, CT ANGIOGRAM NECK-, CT CEREBRAL PERFUSION W WO  CONTRAST-     CT ANGIOGRAM HEAD AND NECK AND CT PERFUSION STUDY     CLINICAL HISTORY: Difficulty with speech. \"Not a team D.\"     Radiation dose reduction techniques were utilized, including automated  exposure control and exposure modulation based on body size. CT scan of  the head was obtained with 3 mm axial images without the use  of IV contrast. The patient underwent a CT  perfusion study with a dynamic bolus of IV contrast. Standard perfusion  maps were constructed. The patient then underwent a CT angiogram of the  head and neck with 1 mm axial images following the administration of IV  contrast. Sagittal, coronal, and 3-dimensional reconstructed images were  obtained.  Percent stenosis was assessed in accordance with NASCET  criteria.  AI analysis of LVO was utilized.     FINDINGS:     NONCONTRAST HEAD CT: On the noncontrast head CT, gray-white matter  differentiation is preserved, and no acute hemorrhage or hydrocephalus  is identified. Mild periventricular hypodensities suggest chronic small  vessel ischemic change in a patient this age.     No enhancing lesion in the brain. A 7 mm densely calcified extra-axial  focus adjacent to the left parietal lobe on sagittal image 56 may be an  exostosis of the skull or calcified meningioma, with no apparent  reactive parenchymal change.        CT PERFUSION STUDY:  No CBF (under 30%) deficit. Estimated 41 cc  perfusion abnormality is demonstrated where the T MAX is greater than 6  seconds, with " "affected areas predominantly including bilateral  cerebellum, left occipital lobe. The calculated mismatch volume was 41  cc.     CTA HEAD and neck: The CT angiogram of the head and neck demonstrates no  hemodynamically significant focal stenosis, aneurysm, or dissection in  the cervical carotid or vertebral arteries, or in the arteries at the  base of the brain. Scattered calcifications are seen in the bilateral  carotid arteries without high-grade stenosis (0-49% stenosis).     Facet and uncovertebral joint hypertrophy contribute to neuroforaminal  narrowing, more prominent on the right at C4/5, and on the left at C3/4,  C5/6, C6/7.     The ascending aorta is dilated, 3.9 cm. A 7 mm short axis right  paratracheal lymph node is seen on axial image 82.       Impression:            1. No completed acute infarct is identified, but perfusion abnormality  suggesting threatened acute infarct is present in the bilateral  cerebellum and left occipital lobe. MRI correlation is suggested as  indicated.     2. No arterial cutoff or high-grade arterial stenosis identified.     3. No acute intracranial hemorrhage or hydrocephalus. No enhancing  lesions of brain.                    This report was finalized on 5/28/2024 7:49 PM by Dr. Jeffrey Kaplan M.D on Workstation: [id number]       CT Angiogram Neck [[id number]] Collected: 05/28/24 1933     Updated: 05/28/24 1952    Narrative:      CT ANGIOGRAM HEAD-, CT ANGIOGRAM NECK-, CT CEREBRAL PERFUSION W WO  CONTRAST-     CT ANGIOGRAM HEAD AND NECK AND CT PERFUSION STUDY     CLINICAL HISTORY: Difficulty with speech. \"Not a team D.\"     Radiation dose reduction techniques were utilized, including automated  exposure control and exposure modulation based on body size. CT scan of  the head was obtained with 3 mm axial images without the use  of IV contrast. The patient underwent a CT  perfusion study with a dynamic bolus of IV contrast. Standard perfusion  maps were constructed. The " patient then underwent a CT angiogram of the  head and neck with 1 mm axial images following the administration of IV  contrast. Sagittal, coronal, and 3-dimensional reconstructed images were  obtained.  Percent stenosis was assessed in accordance with NASCET  criteria.  AI analysis of LVO was utilized.     FINDINGS:     NONCONTRAST HEAD CT: On the noncontrast head CT, gray-white matter  differentiation is preserved, and no acute hemorrhage or hydrocephalus  is identified. Mild periventricular hypodensities suggest chronic small  vessel ischemic change in a patient this age.     No enhancing lesion in the brain. A 7 mm densely calcified extra-axial  focus adjacent to the left parietal lobe on sagittal image 56 may be an  exostosis of the skull or calcified meningioma, with no apparent  reactive parenchymal change.        CT PERFUSION STUDY:  No CBF (under 30%) deficit. Estimated 41 cc  perfusion abnormality is demonstrated where the T MAX is greater than 6  seconds, with affected areas predominantly including bilateral  cerebellum, left occipital lobe. The calculated mismatch volume was 41  cc.     CTA HEAD and neck: The CT angiogram of the head and neck demonstrates no  hemodynamically significant focal stenosis, aneurysm, or dissection in  the cervical carotid or vertebral arteries, or in the arteries at the  base of the brain. Scattered calcifications are seen in the bilateral  carotid arteries without high-grade stenosis (0-49% stenosis).     Facet and uncovertebral joint hypertrophy contribute to neuroforaminal  narrowing, more prominent on the right at C4/5, and on the left at C3/4,  C5/6, C6/7.     The ascending aorta is dilated, 3.9 cm. A 7 mm short axis right  paratracheal lymph node is seen on axial image 82.       Impression:            1. No completed acute infarct is identified, but perfusion abnormality  suggesting threatened acute infarct is present in the bilateral  cerebellum and left occipital  "lobe. MRI correlation is suggested as  indicated.     2. No arterial cutoff or high-grade arterial stenosis identified.     3. No acute intracranial hemorrhage or hydrocephalus. No enhancing  lesions of brain.                    This report was finalized on 5/28/2024 7:49 PM by Dr. Jeffrey Kaplan M.D on Workstation: [id number]       CT CEREBRAL PERFUSION WITH & WITHOUT CONTRAST [[id number]] Collected: 05/28/24 1933     Updated: 05/28/24 1952    Narrative:      CT ANGIOGRAM HEAD-, CT ANGIOGRAM NECK-, CT CEREBRAL PERFUSION W WO  CONTRAST-     CT ANGIOGRAM HEAD AND NECK AND CT PERFUSION STUDY     CLINICAL HISTORY: Difficulty with speech. \"Not a team D.\"     Radiation dose reduction techniques were utilized, including automated  exposure control and exposure modulation based on body size. CT scan of  the head was obtained with 3 mm axial images without the use  of IV contrast. The patient underwent a CT  perfusion study with a dynamic bolus of IV contrast. Standard perfusion  maps were constructed. The patient then underwent a CT angiogram of the  head and neck with 1 mm axial images following the administration of IV  contrast. Sagittal, coronal, and 3-dimensional reconstructed images were  obtained.  Percent stenosis was assessed in accordance with NASCET  criteria.  AI analysis of LVO was utilized.     FINDINGS:     NONCONTRAST HEAD CT: On the noncontrast head CT, gray-white matter  differentiation is preserved, and no acute hemorrhage or hydrocephalus  is identified. Mild periventricular hypodensities suggest chronic small  vessel ischemic change in a patient this age.     No enhancing lesion in the brain. A 7 mm densely calcified extra-axial  focus adjacent to the left parietal lobe on sagittal image 56 may be an  exostosis of the skull or calcified meningioma, with no apparent  reactive parenchymal change.        CT PERFUSION STUDY:  No CBF (under 30%) deficit. Estimated 41 cc  perfusion abnormality is " demonstrated where the T MAX is greater than 6  seconds, with affected areas predominantly including bilateral  cerebellum, left occipital lobe. The calculated mismatch volume was 41  cc.     CTA HEAD and neck: The CT angiogram of the head and neck demonstrates no  hemodynamically significant focal stenosis, aneurysm, or dissection in  the cervical carotid or vertebral arteries, or in the arteries at the  base of the brain. Scattered calcifications are seen in the bilateral  carotid arteries without high-grade stenosis (0-49% stenosis).     Facet and uncovertebral joint hypertrophy contribute to neuroforaminal  narrowing, more prominent on the right at C4/5, and on the left at C3/4,  C5/6, C6/7.     The ascending aorta is dilated, 3.9 cm. A 7 mm short axis right  paratracheal lymph node is seen on axial image 82.       Impression:            1. No completed acute infarct is identified, but perfusion abnormality  suggesting threatened acute infarct is present in the bilateral  cerebellum and left occipital lobe. MRI correlation is suggested as  indicated.     2. No arterial cutoff or high-grade arterial stenosis identified.     3. No acute intracranial hemorrhage or hydrocephalus. No enhancing  lesions of brain.                    This report was finalized on 5/28/2024 7:49 PM by Dr. Jeffrey Kaplan M.D on Workstation: [id number]               I have reviewed the medications.  ---------------------------------------------------------------------------------------------  Assessment & Plan   Assessment/Problem List    TIA (transient ischemic attack)      Plan:    Dysarthria  Suspected TIA  -Symptoms resolved spontaneously  -CTA/CTP: No acute infarcts, Tmax greater than 6 in the cerebellum and occipital lobe  -MRI brain pending, negative acute  -Blood work is unremarkable  -Echo on 5/9/2024 showed no PFO, no ASD, or VSD  -Dr. Schwarz reviewed case over the phone, recommend n.p.o. overnight and neuroevaluation  tomorrow  -Dr. Jacome has evaluated, recommending change Xarelto to Eliquis, less than 140 SBP goal.      A-fib  -Continue home sotalol, will change Xarelto to Eliquis per neurology recommendation    Disposition: Home    Follow-up after Discharge: PCP and neurology    This note will serve as a discharge summary    Carmella Navarro PA-C 05/29/24 17:57 EDT    I have worn appropriate PPE during this patient encounter, sanitized my hands both with entering and exiting patient's room.      55 minutes has been spent by [facility] providers in the care of this patient while under observation status

## 2024-05-29 NOTE — PLAN OF CARE
Goal Outcome Evaluation:              Outcome Evaluation: Clinical swallow eval completed.  Recommend regular textures and thin liquids.  Meds whole as tolerated with thin or purée.  Recommend general aspiration precautions.  SLP to sign off, please reconsult as warranted.      Anticipated Discharge Disposition (SLP): unknown          SLP Swallowing Diagnosis: swallow WFL/no suspected pharyngeal impairment (05/29/24 0900)

## 2024-05-29 NOTE — PLAN OF CARE
Goal Outcome Evaluation:  Plan of Care Reviewed With: patient        Progress: improving  Outcome Evaluation: Patient is an 83 year old male admitted to the observation unit for TIA. Patient is alert and oriented, room air and ambulates to the bathroom via standby assist. NIH is 1 for facial droop. Patient had mri during the shift. Sodium chloride running at 125ml/hr. PT consulted. Patient is NPO.

## 2024-05-29 NOTE — PLAN OF CARE
Goal Outcome Evaluation:   Patient discharged to private vehicle from unit. IV was removed and belongings taken by the patient. Discharge and follow up info was discussed with the patient who in turn relayed understanding to the nurse.

## 2024-05-29 NOTE — H&P
[facility]   HISTORY AND PHYSICAL    Patient Name: Wilberto Madison  : 1940  MRN: [id number]  Primary Care Physician:  Larry Schofield MD (Tony)  Date of admission: 2024    Subjective   Subjective     Chief Complaint:   Chief Complaint   Patient presents with    Neuro Deficit(s)         HPI:    Wilberto Madison is a 83 y.o. male who presented to the ED this evening after episode of speech disturbance.  He described as he could not get the words out.  He has a history of A-fib and is adherent with Xarelto therapy.  He stated he was out running errands today and when he sat down at the dinner table the fire alarm went off for about 5 minutes, after that he said he thoughts were clear in his head but he could not form the words.  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  During this evaluation he did not have any overt dysarthria, no focal deficits.    His ED workup included CT a of head and neck, CTP brain.  Showing Tmax area greater than 6 cm bilateral cerebellum and occipital lobe.  Suspected for threatened ischemia.  Dr. Schwarz was consulted over the phone and recommends him stay n.p.o. overnight, will continue the Xarelto and consult neurology in the morning.    Review of Systems   Constitutional:  No weight changes, fever, or chills. No night sweats, no fatigue, no malaise.    ENT/Mouth:  No hearing changes, no ear pain, no nasal congestion, no sinus pain, no hoarseness, no sore throat, no rhinorrhea, no dysphagia.   Eyes:  No eye pain, swelling, or redness. No foreign body, discharge. No vision changes.    Cardiovascular:  No chest pain, no shortness of air, no dyspnea on exertion, no orthopnea, no palpitations, no edema.      Respiratory:  No cough, no smoke exposure, no dyspnea.    Gastrointestinal:  No nausea, vomiting, or diarrhea. No constipation, or GI discomfort. No reflux pain, no anorexia, no dysphagia. No  hematochezia or melena.    Genitourinary:  No dyspareunia, no dysuria, no urinary frequency. No hematuria, no urinary incontinence. No flank pain or urinary flow changes.   Musculoskeletal:  No arthralgias, no myalgias, no joint swelling or stiffness. No back or neck pain, no recent injuries.    Skin:  No skin lesions, no pruritus, no hair changes.    Neuro:  No weakness, no numbness, no paresthesias, no loss of consciousness, no syncope, no dizziness, no headache, no coordination changes, no recent falls.   Psych:  No anxiety/panic, no depression, no insomnia, no personality changes, no delusions. Denies SI/HI, denies auditory or visual hallucinations. No social issues, no memory changes.      Heme/Lymph:  No bruising, or bleeding. No transfusions history, no lymphadenopathy.   Endocrine:  No polyuria, polydipsia, no temperature intolerances.       Personal History     Past Medical History:   Diagnosis Date    Arrhythmia     Atrial fibrillation     Benign prostatic hyperplasia without lower urinary tract symptoms     Coronary artery disease 1989    ED (erectile dysfunction)     H/O bone density study 12/2015    [facility]    Hyperlipidemia     Hypertension     Myocardial infarction 1989    Was called “aborted “ attack    Paroxysmal atrial fibrillation        Past Surgical History:   Procedure Laterality Date    ANGIOPLASTY      BACK SURGERY      BIOPSY PROSTATE NEEDLE / PUNCH / INCISIONAL  2000    COLONOSCOPY  2014    NEG    CORONARY ANGIOPLASTY      LAMINECTOMY  2014    LUMBAR       Family History: family history includes Heart attack in his father; Heart disease in his brother and father; Stroke in his mother. Otherwise pertinent FHx was reviewed and not pertinent to current issue.    Social History:  reports that he quit smoking about 44 years ago. His smoking use included pipe. He has been exposed to tobacco smoke. He has never used smokeless tobacco. He reports current alcohol use of about 6.0 standard drinks of  alcohol per week. He reports that he does not use drugs.    Home Medications:  B-12, atorvastatin, cycloSPORINE, erythromycin, hydroCHLOROthiazide, ketoconazole, levothyroxine, nitroglycerin, rivaroxaban, sotalol, tamsulosin, triamcinolone, and vitamin D3    Allergies:  No Known Allergies    Objective   Objective     Vitals:   Temp:  [98.5 °F (36.9 °C)] 98.5 °F (36.9 °C)  Heart Rate:  [59-94] 73  Resp:  [18] 18  BP: (144-167)/(101-109) 158/109   Physical Exam:     Constitutional: Awake, alert. Well developed for age. Non-toxic appearing.   Eyes: PERRL x2, sclerae anicteric, no conjunctival injection. No EOM noted.   HENT: NCAT, mucous membranes moist,   Neck: Supple, no thyromegaly, no lymphadenopathy, trachea midline  Respiratory: Clear to auscultation bilaterally, nonlabored respirations   Cardiovascular: RRR, no murmurs, rubs, or gallops, palpable pedal pulses bilaterally. No appreciable edema.   Gastrointestinal: Positive bowel sounds, soft, nontender, not distended.   Musculoskeletal: No bilateral ankle edema, no clubbing or cyanosis to extremities. No obvious deformities.   Psychiatric: Appropriate affect, cooperative. Converses appropriately for age.   Neurologic: Oriented x 3, strength symmetric in all extremities, Cranial Nerves grossly intact to confrontation, speech clear  Skin: No rashes, skin intact.     Result Review    Result Review:  I have personally reviewed the results from the time of this admission to 5/28/2024 23:23 EDT and agree with these findings:  [x]  Laboratory list / accordion  []  Microbiology  [x]  Radiology  [x]  EKG/Telemetry   []  Cardiology/Vascular   []  Pathology  []  Old records  []  Other:  Most notable findings include:   CTA/CTP: No acute infarcts, Tmax greater than 6 in the cerebellum and occipital lobe  Chronic A-fib      Assessment & Plan   Assessment / Plan     Brief Patient Summary:  Wilberto Madison is a 83 y.o. male who presents after episode of dysarthria at the dinner  table, being evaluated for TIA.    Active Hospital Problems:  Active Hospital Problems    Diagnosis     **TIA (transient ischemic attack)      Plan:     Dysarthria  Suspected TIA  -Symptoms resolved spontaneously  -CTA/CTP: No acute infarcts, Tmax greater than 6 in the cerebellum and occipital lobe  -MRI brain pending  -Blood work is unremarkable  -Echo on 5/9/2024 showed no PFO, no ASD, or VSD  -Dr. Schwarz reviewed case over the phone, recommend n.p.o. overnight and neuroevaluation tomorrow    A-fib  -Continue home sotalol, Xarelto      DVT prophylaxis:  Medical and mechanical DVT prophylaxis orders are present.    CODE STATUS: Full code     Admission Status:  I believe this patient meets observation status.    Electronically signed by JENNIFER Vela, 05/28/24, 11:23 PM EDT.        75 minutes has been spent by [facility] providers in the care of this patient while under observation status      I have worn appropriate PPE during this patient encounter, sanitized my hands both with entering and exiting patient's room.    I have discussed plan of care with patient including advance care plan and/or surrogate decision maker.  Patient advises that their daughter, Wendy Mijares, will be their primary surrogate decision maker

## 2024-05-29 NOTE — OUTREACH NOTE
Prep Survey      Flowsheet Row Responses   [facility] facility patient discharged from? [facility]   Is LACE score < 7 ? Yes   Eligibility [facility]   Date of Admission 05/28/24   Date of Discharge 05/29/24   Discharge Disposition Home or Self Care   Discharge diagnosis TIA   Does the patient have one of the following disease processes/diagnoses(primary or secondary)? Stroke   Does the patient have Home health ordered? No   Is there a DME ordered? No   Prep survey completed? Yes            ELAINA MOODY - Registered Nurse

## 2024-05-29 NOTE — THERAPY EVALUATION
Acute Care - Speech Language Pathology   Swallow Initial Evaluation [facility]     Patient Name: Wilberto Madison  : 1940  MRN: [id number]  Today's Date: 2024               Admit Date: 2024    Visit Dx:     ICD-10-CM ICD-9-CM   1. TIA (transient ischemic attack)  G45.9 435.9   2. Chronic a-fib  I48.20 427.31   3. Chronic anticoagulation  Z79.01 V58.61     Patient Active Problem List   Diagnosis    Persistent atrial fibrillation    Hyperlipidemia    Hypertension    Benign prostatic hyperplasia without lower urinary tract symptoms    Coronary artery disease involving native coronary artery of native heart without angina pectoris    TIA (transient ischemic attack)     Past Medical History:   Diagnosis Date    Arrhythmia     Atrial fibrillation     Benign prostatic hyperplasia without lower urinary tract symptoms     Coronary artery disease     ED (erectile dysfunction)     H/O bone density study 2015    [facility]    Hyperlipidemia     Hypertension     Myocardial infarction     Was called “aborted “ attack    Paroxysmal atrial fibrillation      Past Surgical History:   Procedure Laterality Date    ANGIOPLASTY      BACK SURGERY      BIOPSY PROSTATE NEEDLE / PUNCH / INCISIONAL      COLONOSCOPY      NEG    CORONARY ANGIOPLASTY      LAMINECTOMY      LUMBAR       SLP Recommendation and Plan  SLP Swallowing Diagnosis: swallow WFL/no suspected pharyngeal impairment (24)  SLP Diet Recommendation: regular textures, thin liquids (24)  Recommended Precautions and Strategies: upright posture during/after eating, small bites of food and sips of liquid, general aspiration precautions (24)  SLP Rec. for Method of Medication Administration: meds whole, meds crushed, with puree (24)     Monitor for Signs of Aspiration: yes, notify SLP if any concerns (24)     Swallow Criteria for Skilled Therapeutic Interventions Met: no problems identified which  require skilled intervention (05/29/24 0900)  Anticipated Discharge Disposition (SLP): unknown (05/29/24 0900)  Rehab Potential/Prognosis, Swallowing: good, to achieve stated therapy goals (05/29/24 0900)  Therapy Frequency (Swallow): evaluation only (05/29/24 0900)  Predicted Duration Therapy Intervention (Days): until discharge (05/29/24 0900)  Oral Care Recommendations: Oral Care BID/PRN (05/29/24 0900)                                        Outcome Evaluation: Clinical swallow eval completed.  Recommend regular textures and thin liquids.  Meds whole as tolerated with thin or purée.  Recommend general aspiration precautions.  SLP to sign off, please reconsult as warranted.      SWALLOW EVALUATION (Last 72 Hours)       SLP Adult Swallow Evaluation       Row Name 05/29/24 0900                   Rehab Evaluation    Document Type evaluation  -OC        Subjective Information no complaints  -OC        Patient Observations alert;cooperative;agree to therapy  -OC        Patient Effort good  -OC        Symptoms Noted During/After Treatment none  -OC           General Information    Patient Profile Reviewed yes  -OC        Pertinent History Of Current Problem Patient admitted for TIA,  CVA workup. MRI negative.  -OC        Current Method of Nutrition NPO  -OC        Precautions/Limitations, Vision WFL  -OC        Precautions/Limitations, Hearing WFL  -OC        Prior Level of Function-Communication WFL  -OC        Prior Level of Function-Swallowing no diet consistency restrictions  -OC        Plans/Goals Discussed with patient and family;agreed upon  -OC        Barriers to Rehab medically complex  -OC        Patient's Goals for Discharge patient did not state  -OC           Pain    Additional Documentation Pain Scale: Numbers Pre/Post-Treatment (Group)  -OC           Pain Scale: Numbers Pre/Post-Treatment    Pretreatment Pain Rating 0/10 - no pain  -OC        Posttreatment Pain Rating 0/10 - no pain  -OC           Oral  Motor Structure and Function    Dentition Assessment natural, present and adequate  -OC        Secretion Management WNL/WFL  -OC        Mucosal Quality moist, healthy  -OC        Volitional Swallow WFL  -OC        Volitional Cough WFL  -OC           Oral Musculature and Cranial Nerve Assessment    Oral Labial or Buccal Impairment, Detail, Cranial Nerve VII (Facial): left labial droop  -OC        Lingual Impairment, Detail. Cranial Nerves IX, XII (Glossopharyngeal and Hypoglossal) reduced strength left;other (see comments)  deviation to the R upon protrusion  -OC           Clinical Swallow Eval    Clinical Swallow Evaluation Summary Patient demonstrated no overt signs or symptoms with water via straw and cup, purée, mechanical soft, and regular texture.  Patient demonstrated adequate acceptance, self-feeding, mastication, and oral clearance.  Timely swallow.  No change in vocal quality across trials.  -OC           SLP Evaluation Clinical Impression    SLP Swallowing Diagnosis swallow WFL/no suspected pharyngeal impairment  -OC        Functional Impact no impact on function  -OC        Rehab Potential/Prognosis, Swallowing good, to achieve stated therapy goals  -OC        Swallow Criteria for Skilled Therapeutic Interventions Met no problems identified which require skilled intervention  -OC           Recommendations    Therapy Frequency (Swallow) evaluation only  -OC        Predicted Duration Therapy Intervention (Days) until discharge  -OC        SLP Diet Recommendation regular textures;thin liquids  -OC        Recommended Precautions and Strategies upright posture during/after eating;small bites of food and sips of liquid;general aspiration precautions  -OC        Oral Care Recommendations Oral Care BID/PRN  -OC        SLP Rec. for Method of Medication Administration meds whole;meds crushed;with puree  -OC        Monitor for Signs of Aspiration yes;notify SLP if any concerns  -OC        Anticipated Discharge  Disposition (SLP) unknown  -OC                  User Key  (r) = Recorded By, (t) = Taken By, (c) = Cosigned By      Initials Name Effective Dates    Merlene Marx SLP 08/28/23 -                     EDUCATION  The patient has been educated in the following areas:   Dysphagia (Swallowing Impairment).                Time Calculation:    Time Calculation- SLP       Row Name 05/29/24 0954             Time Calculation- SLP    SLP Start Time 0900  -OC      SLP Received On 05/29/24  -OC         Untimed Charges    SLP Eval/Re-eval  ST Eval Oral Pharyng Swallow - 89096  -OC      80336-WV Eval Oral Pharyng Swallow Minutes 60  -OC         Total Minutes    Untimed Charges Total Minutes 60  -OC       Total Minutes 60  -OC                User Key  (r) = Recorded By, (t) = Taken By, (c) = Cosigned By      Initials Name Provider Type    Merlene Marx SLP Speech and Language Pathologist                    Therapy Charges for Today       Code Description Service Date Service Provider Modifiers Qty    [id number] HC ST EVAL ORAL PHARYNG SWALLOW 4 5/29/2024 Merlene Dalton SLP GN 1                 NATE Croft  5/29/2024

## 2024-05-30 ENCOUNTER — TRANSITIONAL CARE MANAGEMENT TELEPHONE ENCOUNTER (OUTPATIENT)
Dept: CALL CENTER | Facility: HOSPITAL | Age: 84
End: 2024-05-30
Payer: MEDICARE

## 2024-05-30 NOTE — OUTREACH NOTE
Call Center TCM Note      Flowsheet Row Responses   [facility] patient discharged from? [place]   Does the patient have one of the following disease processes/diagnoses(primary or secondary)? Stroke   TCM attempt successful? No   Unsuccessful attempts Attempt 1   Revoked Reason Patient/Family Refused  [Patient answered phone and stated it was not a good time for him.]            Genesis Cunningham RN    5/30/2024, 13:41 EDT

## 2024-06-13 NOTE — TELEPHONE ENCOUNTER
Incoming Refill Request      Medication requested (name and dose):     apixaban (ELIQUIS) 5 MG tablet tablet  5 mg, 2 Times Daily       Pharmacy where request should be sent:     [facility] - [address] - [phone] [facility] [phone] [facility] [phone]       Additional details provided by patient: PATIENT WAS PRESCRIBED THIS DURING LAST HOSPITAL STAY    Best call back number: [phone]    Does the patient have less than a 3 day supply:  [x] Yes  [] No    Portia Jaramillo Rep  06/13/24, 11:13 EDT

## 2024-06-13 NOTE — TELEPHONE ENCOUNTER
Could you please send this to [facility]?  It looks like it went to the [facility] pharmacy.    Thank you

## 2024-06-20 ENCOUNTER — TELEPHONE (OUTPATIENT)
Dept: INTERNAL MEDICINE | Facility: CLINIC | Age: 84
End: 2024-06-20

## 2024-06-20 NOTE — TELEPHONE ENCOUNTER
PATIENT CALLED AND STATES THE PHARMACY [facility] HAS QUESTIONS ABOUT HIS NEW MEDICATION     apixaban (ELIQUIS) 5 MG tablet tablet     PLEASE CALL     [facility] - [place], [address] - [phone] [facility] [phone]  [phone]     PATIENT'S CALL BACK  NUMBER [phone]

## 2024-06-24 NOTE — TELEPHONE ENCOUNTER
"  Caller: Wilberto Madison \"Del\"    Relationship: Self    Best call back number: [phone]    Requested Prescriptions:   Requested Prescriptions     Pending Prescriptions Disp Refills    apixaban (ELIQUIS) 5 MG tablet tablet 180 tablet 4     Sig: Take 1 tablet by mouth 2 (Two) Times a Day.        Pharmacy where request should be sent: [facility] [phone] [place] [address] [facility] AT [facility] [phone] [facility] [phone]      Last office visit with prescribing clinician: 5/14/2024   Last telemedicine visit with prescribing clinician: Visit date not found   Next office visit with prescribing clinician: 9/16/2024     Additional details provided by patient: STATED THAT THEY ARE GOING TO BE OUT OF THE MEDICATION IN A COUPLE OF DAYS AND WILL BE GOING ON A MINI VACATION STARTING ON WEDNESDAY 6/26/24. STATED THAT THEY NEED A 30 DAY SUPPLY SENT IN SO THEY CAN GET THE MEDICATION UNTIL DR. LANDRUM CALLS [facility] TO SEE WHAT THEY NEED FROM HIM    Does the patient have less than a 3 day supply:  [x] Yes  [] No    Would you like a call back once the refill request has been completed: [x] Yes [] No    If the office needs to give you a call back, can they leave a voicemail: [x] Yes [] No    Portia Gamino   06/24/24 09:51 EDT       "

## 2024-06-25 ENCOUNTER — TELEPHONE (OUTPATIENT)
Dept: INTERNAL MEDICINE | Facility: CLINIC | Age: 84
End: 2024-06-25

## 2024-06-25 NOTE — TELEPHONE ENCOUNTER
"  Caller: Wilberto Madison \"Del\"    Relationship: Self    Best call back number: [phone]    What medication are you requesting: apixaban (ELIQUIS) 5 MG tablet tablet     If a prescription is needed, what is your preferred pharmacy and phone number: [id number] [address] [facility] AT [facility] [phone] [facility] [phone] FX     Additional notes PRESCRIPTION SENT TO [facility]. PATIENT STATES [facility] DOES NOT ACCEPT HIS INSURANCE.     PLEASE SEND NEW PRESCRIPTION TO [facility]    "

## 2024-06-26 ENCOUNTER — TELEPHONE (OUTPATIENT)
Dept: INTERNAL MEDICINE | Facility: CLINIC | Age: 84
End: 2024-06-26

## 2024-06-26 ENCOUNTER — TELEMEDICINE (OUTPATIENT)
Dept: INTERNAL MEDICINE | Facility: CLINIC | Age: 84
End: 2024-06-26
Payer: MEDICARE

## 2024-06-26 DIAGNOSIS — U07.1 COVID-19 VIRUS DETECTED: Primary | ICD-10-CM

## 2024-06-26 PROCEDURE — 99213 OFFICE O/P EST LOW 20 MIN: CPT | Performed by: INTERNAL MEDICINE

## 2024-06-26 NOTE — PROGRESS NOTES
"Chief Complaint  Covid-19 Home Monitoring Video Visit    Subjective        Wilberto Madison presents to [facility]  History of Present Illness  He had taken 1 COVID test this morning that was slightly positive and took an additional 2 test that were negative.  He had some mild rhinorrhea and congestion.  No fever or chills and feels good otherwise.  [place] and was just concerned he did not need to do anything differently.You have chosen to receive care through a telehealth visit.  Do you consent to use a video/audio connection for your medical care today? Yes   Objective   Vital Signs:  There were no vitals taken for this visit.  Estimated body mass index is 27.89 kg/m² as calculated from the following:    Height as of 5/28/24: 177.8 cm (70\").    Weight as of 5/28/24: 88.2 kg (194 lb 6.4 oz).               Physical Exam pleasant gentleman seems to be doing well.  [place] and looks comfortable in no distress.  Result Review :                     Assessment and Plan     Diagnoses and all orders for this visit:    1. COVID-19 virus detected (Primary)    COVID-19 positive test.  Very mild symptoms and this is his third time having COVID so all points to a very mild illness.  He took 2 additional test today that were negative.  In light of his very mild symptoms and previous infections I think probably not worth the risk of taking the Paxlovid in light of the Eliquis.  If his symptoms get worse or change, told him to call back or come back in.  Tylenol as needed.  OTC cough medicine is okay if needed.         Follow Up     No follow-ups on file.  Patient was given instructions and counseling regarding his condition or for health maintenance advice. Please see specific information pulled into the AVS if appropriate.         "

## 2024-07-01 ENCOUNTER — HOSPITAL ENCOUNTER (OUTPATIENT)
Facility: HOSPITAL | Age: 84
Discharge: HOME | End: 2024-07-01
Attending: EMERGENCY MEDICINE | Admitting: EMERGENCY MEDICINE
Payer: MEDICARE

## 2024-07-01 ENCOUNTER — APPOINTMENT (OUTPATIENT)
Dept: GENERAL RADIOLOGY | Facility: HOSPITAL | Age: 84
End: 2024-07-01
Payer: MEDICARE

## 2024-07-01 VITALS
OXYGEN SATURATION: 97 % | SYSTOLIC BLOOD PRESSURE: 126 MMHG | RESPIRATION RATE: 15 BRPM | WEIGHT: 192 LBS | BODY MASS INDEX: 27.49 KG/M2 | HEIGHT: 70 IN | TEMPERATURE: 97.8 F | HEART RATE: 71 BPM | DIASTOLIC BLOOD PRESSURE: 99 MMHG

## 2024-07-01 DIAGNOSIS — I48.0 PAROXYSMAL ATRIAL FIBRILLATION: ICD-10-CM

## 2024-07-01 DIAGNOSIS — J06.9 UPPER RESPIRATORY TRACT INFECTION, UNSPECIFIED TYPE: Primary | ICD-10-CM

## 2024-07-01 LAB
FLUAV SUBTYP SPEC NAA+PROBE: NOT DETECTED
FLUBV RNA ISLT QL NAA+PROBE: NOT DETECTED
QT INTERVAL: 399 MS
QTC INTERVAL: 458 MS
SARS-COV-2 RNA RESP QL NAA+PROBE: NOT DETECTED

## 2024-07-01 PROCEDURE — 71045 X-RAY EXAM CHEST 1 VIEW: CPT

## 2024-07-01 PROCEDURE — 87636 SARSCOV2 & INF A&B AMP PRB: CPT | Performed by: EMERGENCY MEDICINE

## 2024-07-01 PROCEDURE — 93005 ELECTROCARDIOGRAM TRACING: CPT

## 2024-07-01 PROCEDURE — G0463 HOSPITAL OUTPT CLINIC VISIT: HCPCS

## 2024-07-01 RX ORDER — DOXYCYCLINE 100 MG/1
100 CAPSULE ORAL 2 TIMES DAILY
Qty: 14 CAPSULE | Refills: 0 | Status: SHIPPED | OUTPATIENT
Start: 2024-07-01 | End: 2024-07-08

## 2024-07-01 NOTE — FSED PROVIDER NOTE
"Subjective   History of Present Illness  Patient is an 83-year-old male with history of paroxysmal A-fib, BPH, CAD, HLD, HTN, MI who presents to the emergency department for congestion and cough x 4 days.  Patient reports he generally feels well, but came in as a \"precaution\", as he has been around family and lives in an assisted living facility.  Reports a fever of about 100 F on onset that has since resolved.  Using an over-the-counter cough syrup that has provided significant relief.  Denies shortness of breath, chest pain, abdominal symptoms, urinary symptoms.  No history of asthma or COPD.        Review of Systems   Constitutional:  Positive for fever. Negative for appetite change, chills, diaphoresis and fatigue.   HENT:  Positive for congestion and rhinorrhea. Negative for ear discharge, ear pain, sore throat, trouble swallowing and voice change.    Eyes:  Negative for pain and redness.   Respiratory:  Positive for cough. Negative for shortness of breath, wheezing and stridor.    Cardiovascular:  Negative for chest pain.   Gastrointestinal:  Negative for abdominal pain, constipation, diarrhea, nausea and vomiting.   Genitourinary:  Negative for difficulty urinating and dysuria.   Skin:  Negative for color change, pallor, rash and wound.       Past Medical History:   Diagnosis Date    Arrhythmia     Atrial fibrillation     Benign prostatic hyperplasia without lower urinary tract symptoms     Coronary artery disease 1989    ED (erectile dysfunction)     H/O bone density study 12/2015    [facility]    Hyperlipidemia     Hypertension     Myocardial infarction 1989    Was called “aborted “ attack    Paroxysmal atrial fibrillation        No Known Allergies    Past Surgical History:   Procedure Laterality Date    ANGIOPLASTY      BACK SURGERY      BIOPSY PROSTATE NEEDLE / PUNCH / INCISIONAL  2000    COLONOSCOPY  2014    NEG    CORONARY ANGIOPLASTY      LAMINECTOMY  2014    LUMBAR       Family History   Problem Relation Age " of Onset    Heart disease Father     Heart attack Father     Heart disease Brother         By pass surgery    Stroke Mother        Social History     Socioeconomic History    Marital status:    Tobacco Use    Smoking status: Former     Types: Pipe     Quit date:      Years since quittin.5     Passive exposure: Past    Smokeless tobacco: Never    Tobacco comments:     Never inhaled. Occasional use at desk.   Vaping Use    Vaping status: Never Used   Substance and Sexual Activity    Alcohol use: Yes     Alcohol/week: 6.0 standard drinks of alcohol     Types: 6 Glasses of wine per week     Comment: One glass per day. No increase over timr.  caffiene- denies use    Drug use: No    Sexual activity: Not Currently     Birth control/protection: Vasectomy     Comment: Wife  3 months ago.           Objective   Physical Exam  Constitutional:       General: He is not in acute distress.     Appearance: He is normal weight. He is not ill-appearing, toxic-appearing or diaphoretic.   HENT:      Nose: Nose normal.      Mouth/Throat:      Mouth: Mucous membranes are moist.      Pharynx: Oropharynx is clear.   Cardiovascular:      Rate and Rhythm: Normal rate. Rhythm irregular.   Pulmonary:      Effort: Pulmonary effort is normal. No respiratory distress.      Breath sounds: Normal breath sounds. No stridor. No wheezing, rhonchi or rales.   Musculoskeletal:         General: Normal range of motion.   Skin:     General: Skin is warm and dry.   Neurological:      General: No focal deficit present.      Mental Status: He is alert.   Psychiatric:         Mood and Affect: Mood normal.         Behavior: Behavior normal.         Procedures           ED Course  ED Course as of 24 1212   Mon 2024   1130 XR CHEST 1 VW-     HISTORY: Male who is 83 years-old, cough     TECHNIQUE: Frontal view of the chest     COMPARISON: None available     FINDINGS: The heart size is borderline. Aorta is tortuous,  calcified.  Pulmonary vasculature is unremarkable. No focal pulmonary consolidation,  pleural effusion, or pneumothorax. No acute osseous process.     IMPRESSION:  No focal pulmonary consolidation. Borderline heart size.  Tortuous aorta. Follow-up as clinically indicated.   [AS]   1130 COVID19: Not Detected [AS]   1130 Influenza A PCR: Not Detected [AS]      ED Course User Index  [AS] Trena Young, RACHEL                                           Medical Decision Making  Patient is an 83-year-old male who presents for upper respiratory symptoms x 4 days.  Generally feels well and is improving.  Symptoms controlled with over-the-counter medications.  Negative for COVID and flu today.  Chest x-ray clear.  Exam largely unremarkable.  Patient overall appears well, no acute distress, nontoxic.  Vital signs are WNL.  Lung sounds are clear.  Most likely viral illness.  However due to age, will prescribe antibiotics and a wait-and-see attempt.  Patient has some heart rate abnormalities on the monitor wearing pulse ox only.  History of proximal A-fib.  Request EKG to see if he is in A-fib at this time.  Reports he has no symptoms.  EKG does reveal rate controlled A-fib.  Patient is anticoagulated and on appropriate medication.  No further action at this time.  Discussed when to return to the emergency department.  Answered all questions.  Patient verbalized understanding and was agreeable to plan and discharge.    My differential diagnosis for cough includes but is not limited to:  Upper respiratory infection, bronchitis, pneumonia, COPD exacerbation, cough variant asthma, cardiac asthma, coronary artery disease, congestive heart failure, bacterial, viral or lung infections, lung cancer, aspiration pneumonitis, aspiration of foreign body and Covid -19           Problems Addressed:  Paroxysmal atrial fibrillation: complicated acute illness or injury  Upper respiratory tract infection, unspecified type: complicated acute illness  or injury    Amount and/or Complexity of Data Reviewed  Labs:  Decision-making details documented in ED Course.  Radiology: ordered.  ECG/medicine tests: ordered.    Risk  Prescription drug management.        Final diagnoses:   Upper respiratory tract infection, unspecified type   Paroxysmal atrial fibrillation       ED Disposition  ED Disposition       ED Disposition   Discharge    Condition   Stable    Comment   --               Larry Schofield ([place]) MD [place]  [address]  [facility] [address]  [phone]    Schedule an appointment as soon as possible for a visit            Medication List        New Prescriptions      doxycycline 100 MG capsule  Commonly known as: MONODOX  Take 1 capsule by mouth 2 (Two) Times a Day for 7 days.            Changed      atorvastatin 10 MG tablet  Commonly known as: LIPITOR  TAKE 1 TABLET DAILY  What changed: when to take this               Where to Get Your Medications        These medications were sent to [facility] - [place], [place] - [address] - [phone]  - [phone] FX  [address], [facility] [address]      Phone: [phone]   doxycycline 100 MG capsule

## 2024-07-01 NOTE — DISCHARGE INSTRUCTIONS
Your symptoms are most likely viral, and should be improving slowly here over the next several days to week.  Continue your over-the-counter cold and cough medications as needed.  Continue good handwashing and wearing a mask as long as your symptoms persist.  Monitor your symptoms x 3 days.  If not improving, start antibiotic and take as prescribed until completion.  Also recommend reevaluation with urgent care or ER for any new or worsening symptoms whatsoever.  You were in A-fib today with good rate control.  Continue all your medications as prescribed.  Return to emergency department for medical emergencies.  Recommended follow-up with PCP.  Refer to the attached instructions for further information.

## 2024-08-06 RX ORDER — ATORVASTATIN CALCIUM 10 MG/1
TABLET, FILM COATED ORAL
Qty: 90 TABLET | Refills: 3 | Status: SHIPPED | OUTPATIENT
Start: 2024-08-06

## 2024-09-16 ENCOUNTER — OFFICE VISIT (OUTPATIENT)
Dept: INTERNAL MEDICINE | Facility: CLINIC | Age: 84
End: 2024-09-16
Payer: MEDICARE

## 2024-09-16 VITALS
HEART RATE: 61 BPM | OXYGEN SATURATION: 99 % | RESPIRATION RATE: 16 BRPM | HEIGHT: 70 IN | WEIGHT: 194 LBS | DIASTOLIC BLOOD PRESSURE: 58 MMHG | BODY MASS INDEX: 27.77 KG/M2 | SYSTOLIC BLOOD PRESSURE: 98 MMHG | TEMPERATURE: 97.3 F

## 2024-09-16 DIAGNOSIS — I10 PRIMARY HYPERTENSION: ICD-10-CM

## 2024-09-16 DIAGNOSIS — E74.819 DISORDERS OF GLUCOSE TRANSPORT, UNSPECIFIED: ICD-10-CM

## 2024-09-16 DIAGNOSIS — E78.2 MIXED HYPERLIPIDEMIA: Primary | ICD-10-CM

## 2024-09-16 PROCEDURE — 99214 OFFICE O/P EST MOD 30 MIN: CPT | Performed by: INTERNAL MEDICINE

## 2024-09-16 RX ORDER — HYDROCHLOROTHIAZIDE 12.5 MG/1
12.5 TABLET ORAL EVERY MORNING
Status: SHIPPED
Start: 2024-09-16

## 2024-09-17 LAB
ALBUMIN SERPL-MCNC: 4 G/DL (ref 3.5–5.2)
ALBUMIN/GLOB SERPL: 1.8 G/DL
ALP SERPL-CCNC: 66 U/L (ref 39–117)
ALT SERPL-CCNC: 13 U/L (ref 1–41)
AST SERPL-CCNC: 16 U/L (ref 1–40)
BILIRUB SERPL-MCNC: 0.6 MG/DL (ref 0–1.2)
BUN SERPL-MCNC: 16 MG/DL (ref 8–23)
BUN/CREAT SERPL: 15.5 (ref 7–25)
CALCIUM SERPL-MCNC: 9.2 MG/DL (ref 8.6–10.5)
CHLORIDE SERPL-SCNC: 97 MMOL/L (ref 98–107)
CHOLEST SERPL-MCNC: 126 MG/DL (ref 0–200)
CHOLEST/HDLC SERPL: 2.74 {RATIO}
CO2 SERPL-SCNC: 27.4 MMOL/L (ref 22–29)
CREAT SERPL-MCNC: 1.03 MG/DL (ref 0.76–1.27)
EGFRCR SERPLBLD CKD-EPI 2021: 70.7 ML/MIN/1.73
GLOBULIN SER CALC-MCNC: 2.2 GM/DL
GLUCOSE SERPL-MCNC: 89 MG/DL (ref 65–99)
HBA1C MFR BLD: 5.9 % (ref 4.8–5.6)
HDLC SERPL-MCNC: 46 MG/DL (ref 40–60)
LDLC SERPL CALC-MCNC: 66 MG/DL (ref 0–100)
POTASSIUM SERPL-SCNC: 3.8 MMOL/L (ref 3.5–5.2)
PROT SERPL-MCNC: 6.2 G/DL (ref 6–8.5)
SODIUM SERPL-SCNC: 137 MMOL/L (ref 136–145)
TRIGL SERPL-MCNC: 67 MG/DL (ref 0–150)
VLDLC SERPL CALC-MCNC: 14 MG/DL (ref 5–40)

## 2024-09-24 ENCOUNTER — TELEPHONE (OUTPATIENT)
Dept: INTERNAL MEDICINE | Facility: CLINIC | Age: 84
End: 2024-09-24

## 2024-09-24 NOTE — TELEPHONE ENCOUNTER
"  Caller: Wilberto Madison \"Del\"    Relationship: Self    Best call back number: [phone]      What was the call regarding: PATIENT CALLING IN WANTED TO TELL  DR LANDRUM THAT HE WILL BE SHORT 3 OR 4 PILLS DUE TO THE PHARMACY DIDN'T SEND THEM ON TIME ON HIS apixaban (ELIQUIS) 5 MG tablet tablet  AND WANTED TO SEE IF THIS WOULD BE OK TO MISS 2 DAYS WORTH OF THIS MEDICATION     PATIENT STATES HE CAN TAKE 1 A DAY IF THAT WOULD BE OK TO MAKE THE 2-3 DAYS LESS OF MISSING MEDICATION    PLEASE GIVE CALL BACK     "

## 2024-11-04 RX ORDER — SOTALOL HYDROCHLORIDE 80 MG/1
TABLET ORAL
Qty: 180 TABLET | Refills: 3 | Status: SHIPPED | OUTPATIENT
Start: 2024-11-04

## 2024-11-20 ENCOUNTER — OFFICE VISIT (OUTPATIENT)
Dept: CARDIOLOGY | Facility: CLINIC | Age: 84
End: 2024-11-20
Payer: MEDICARE

## 2024-11-20 VITALS
SYSTOLIC BLOOD PRESSURE: 140 MMHG | OXYGEN SATURATION: 98 % | DIASTOLIC BLOOD PRESSURE: 72 MMHG | HEIGHT: 70 IN | BODY MASS INDEX: 27.92 KG/M2 | HEART RATE: 55 BPM | WEIGHT: 195 LBS

## 2024-11-20 DIAGNOSIS — I10 PRIMARY HYPERTENSION: ICD-10-CM

## 2024-11-20 DIAGNOSIS — G45.9 TIA (TRANSIENT ISCHEMIC ATTACK): ICD-10-CM

## 2024-11-20 DIAGNOSIS — I25.10 CORONARY ARTERY DISEASE INVOLVING NATIVE CORONARY ARTERY OF NATIVE HEART WITHOUT ANGINA PECTORIS: ICD-10-CM

## 2024-11-20 DIAGNOSIS — E78.2 MIXED HYPERLIPIDEMIA: ICD-10-CM

## 2024-11-20 DIAGNOSIS — I48.19 PERSISTENT ATRIAL FIBRILLATION: Primary | ICD-10-CM

## 2024-11-20 RX ORDER — AMLODIPINE BESYLATE 5 MG/1
5 TABLET ORAL NIGHTLY
Qty: 90 TABLET | Refills: 3 | Status: SHIPPED | OUTPATIENT
Start: 2024-11-20

## 2024-11-20 NOTE — PROGRESS NOTES
Date of Office Visit: 2024  Encounter Provider: Any Linn MD  Place of Service: [facility] CARDIOLOGY  Patient Name: Wilberto Madison  :1940      Patient ID:  Wilberto Madison is a 84 y.o. male is here for  followup for CAD, PAF.        History of Present Illness    He has a history of a myocardial infarction with angioplasty done in , paroxysmal atrial fibrillation, hypertension, hyperlipidemia, erectile dysfunction.     His father and brother both had heart disease and his mother strokes.     He is , has 3 children, is retired , has a couple cups of coffee per day and 1 glass of wine daily.  He smoked a pipe but quit .     He had a nonischemic stress nuclear perfusion study done 10/17/2019.     His wife   and he resides at the [facility].  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     He had an echocardiogram done 11/3/2022 showing ejection fraction of 66% with normal diastolic function.     Echocardiogram done 2024 showed ejection fraction 61% with normal RVSP, mild dilation of aortic root at 4.1 cm, mild mitral and tricuspid insufficiency.      He was admitted with trouble with his speech 2024.  That morning, he had forgotten to take his medications.  In addition to that, there was a fire alarm that went off in the dining room and greatly startled him.  CT angiogram head and neck showed no significant stenosis or occlusion.  It was recommended that he switch from Xarelto to Eliquis, aspirin was not added at that time it was recommended that he follow-up in stroke clinic.  MRI brain done 2024 showed no acute intracranial abnormality.  Labs on 2024 show normal CMP, hemoglobin A1c 5.9%, total cholesterol 126, HDL 46, LDL 66, triglycerides 67, VLDL 14.  He had normal CBC done 2024.    He has no chest pain or  pressure.  His blood pressure is elevated.  He is taking amlodipine 2.5 mg daily and hydrochlorothiazide 12.5 mg daily.  He is on sotalol for atrial fibrillation as well as Eliquis.  He has no orthopnea or PND.  He has no exertional dyspnea.  He has gained about 10 pounds and would like to lose it.  He is exercising and feels well.      Past Medical History:   Diagnosis Date    Arrhythmia     Atrial fibrillation     Benign prostatic hyperplasia without lower urinary tract symptoms     Coronary artery disease 1989    ED (erectile dysfunction)     H/O bone density study 12/2015    [facility]    Hyperlipidemia     Hypertension     Myocardial infarction 1989    Was called “aborted “ attack    Paroxysmal atrial fibrillation          Past Surgical History:   Procedure Laterality Date    ANGIOPLASTY      BACK SURGERY      BIOPSY PROSTATE NEEDLE / PUNCH / INCISIONAL  2000    COLONOSCOPY  2014    NEG    CORONARY ANGIOPLASTY      LAMINECTOMY  2014    LUMBAR       Current Outpatient Medications on File Prior to Visit   Medication Sig Dispense Refill    amLODIPine (NORVASC) 5 MG tablet Take 1 tablet by mouth Daily As Needed (If systolic blood pressure higher than 140). 30 tablet 0    apixaban (ELIQUIS) 5 MG tablet tablet Take 1 tablet by mouth 2 (Two) Times a Day. 180 tablet 4    atorvastatin (LIPITOR) 10 MG tablet TAKE 1 TABLET DAILY 90 tablet 3    Cholecalciferol (VITAMIN D3) 5000 units capsule capsule Take 1 capsule by mouth Every Night.      Cyanocobalamin (B-12) 1000 MCG capsule Take 1 capsule by mouth Daily.      hydroCHLOROthiazide 12.5 MG tablet Take 1 tablet by mouth Every Morning.      levothyroxine (SYNTHROID, LEVOTHROID) 100 MCG tablet TAKE 1 TABLET DAILY 90 tablet 3    nitroglycerin (Nitrostat) 0.4 MG SL tablet Place 1 tablet under the tongue Every 5 (Five) Minutes As Needed for Chest Pain. Take no more than 3 doses in 15 minutes. 25 tablet 1    sotalol (BETAPACE) 80 MG tablet TAKE 1 TABLET TWICE A  tablet 3     "tamsulosin (FLOMAX) 0.4 MG capsule 24 hr capsule TAKE 1 CAPSULE TWICE A  capsule 3    [DISCONTINUED] erythromycin (ROMYCIN) 5 MG/GM ophthalmic ointment Administer 1 Application to both eyes As Needed (dry eye). (Patient not taking: Reported on 2024)      [DISCONTINUED] ketoconazole (NIZORAL) 2 % shampoo  (Patient not taking: Reported on 2024)      [DISCONTINUED] Restasis 0.05 % ophthalmic emulsion  (Patient not taking: Reported on 2024)      [DISCONTINUED] triamcinolone (KENALOG) 0.1 % cream  (Patient not taking: Reported on 2024)       No current facility-administered medications on file prior to visit.       Social History     Socioeconomic History    Marital status:    Tobacco Use    Smoking status: Former     Current packs/day: 0.00     Types: Pipe, Cigarettes     Quit date: 1980     Years since quittin.9     Passive exposure: Past    Smokeless tobacco: Never    Tobacco comments:     Never inhaled. Occasional use at desk.   Vaping Use    Vaping status: Never Used   Substance and Sexual Activity    Alcohol use: Yes     Alcohol/week: 6.0 standard drinks of alcohol     Types: 6 Glasses of wine per week     Comment: One glass per day. No increase over timr.    Drug use: No    Sexual activity: Not Currently     Partners: Female     Birth control/protection: Vasectomy     Comment: Wife  2022             Procedures    ECG 12 Lead    Date/Time: 2024 2:30 PM  Performed by: Any Linn MD    Authorized by: Any Linn MD  Comparison: compared with previous ECG   Similar to previous ECG  Rhythm: sinus rhythm  Other findings: non-specific ST-T wave changes    Clinical impression: non-specific ECG              Objective:      Vitals:    24 1354   BP: 140/72   BP Location: Left arm   Patient Position: Sitting   Cuff Size: Adult   Pulse: 55   SpO2: 98%   Weight: 88.5 kg (195 lb)   Height: 177.8 cm (70\")     Body mass index is 27.98 " kg/m².    Vitals reviewed.   Constitutional:       General: Not in acute distress.     Appearance: Not diaphoretic.   Neck:      Vascular: No carotid bruit or JVD.   Pulmonary:      Effort: Pulmonary effort is normal.      Breath sounds: Normal breath sounds.   Cardiovascular:      Normal rate. Regular rhythm.      Murmurs: There is no murmur.      No gallop.  No rub.   Pulses:     Intact distal pulses.      Carotid: 2+ bilaterally.     Radial: 2+ bilaterally.     Dorsalis pedis: 2+ bilaterally.     Posterior tibial: 2+ bilaterally.  Edema:     Peripheral edema absent.   Neurological:      Cranial Nerves: No cranial nerve deficit.         Lab Review:       Assessment:      Diagnosis Plan   1. Persistent atrial fibrillation        2. Primary hypertension        3. Coronary artery disease involving native coronary artery of native heart without angina pectoris        4. Mixed hyperlipidemia        5. TIA (transient ischemic attack)          CAD with history of angioplasty to LAD in 1988, no angina.  Hypertension, goal less than 120/80, increase amlodipine to 5 mg nightly and remain on hydrochlorothiazide 12.5 mg daily.  He was taking 2.5 of amlodipine but his blood pressures running 150s/80s.  PAF, on sotalol and Eliquis.  Hyperlipidemia, on atorvastatin.     Plan:       See Nicci in 6 months to reevaluate blood pressure.  Overall think he is doing well, advised continued exercise.

## 2024-12-06 RX ORDER — AMLODIPINE BESYLATE 5 MG/1
5 TABLET ORAL DAILY
Qty: 90 TABLET | Refills: 3 | Status: SHIPPED | OUTPATIENT
Start: 2024-12-06

## 2025-01-02 ENCOUNTER — TELEPHONE (OUTPATIENT)
Dept: INTERNAL MEDICINE | Facility: CLINIC | Age: 85
End: 2025-01-02

## 2025-01-02 NOTE — TELEPHONE ENCOUNTER
"  Caller: Wilberto Madison \"Del\"    Relationship: Self    Best call back number:     What is the best time to reach you:     Who are you requesting to speak with (clinical staff, provider,  specific staff member): DR LANDRUM    Do you know the name of the person who called: PATIENT WANTS TO BE CALLED TO DISCUSS CHANGING HIS ELIQUIS TO XORELLTO FOR A WEEK.        "

## 2025-01-31 RX ORDER — TAMSULOSIN HYDROCHLORIDE 0.4 MG/1
CAPSULE ORAL
Qty: 180 CAPSULE | Refills: 3 | Status: SHIPPED | OUTPATIENT
Start: 2025-01-31

## 2025-02-06 RX ORDER — LEVOTHYROXINE SODIUM 100 UG/1
TABLET ORAL
Qty: 90 TABLET | Refills: 3 | Status: SHIPPED | OUTPATIENT
Start: 2025-02-06

## 2025-03-10 RX ORDER — HYDROCHLOROTHIAZIDE 12.5 MG/1
12.5 TABLET ORAL EVERY MORNING
Qty: 90 TABLET | Refills: 3 | Status: SHIPPED | OUTPATIENT
Start: 2025-03-10

## 2025-04-22 ENCOUNTER — OFFICE VISIT (OUTPATIENT)
Dept: INTERNAL MEDICINE | Facility: CLINIC | Age: 85
End: 2025-04-22
Payer: MEDICARE

## 2025-04-22 VITALS
OXYGEN SATURATION: 96 % | SYSTOLIC BLOOD PRESSURE: 108 MMHG | TEMPERATURE: 96.7 F | HEART RATE: 76 BPM | BODY MASS INDEX: 27.66 KG/M2 | HEIGHT: 70 IN | RESPIRATION RATE: 16 BRPM | WEIGHT: 193.2 LBS | DIASTOLIC BLOOD PRESSURE: 58 MMHG

## 2025-04-22 DIAGNOSIS — I48.19 PERSISTENT ATRIAL FIBRILLATION: ICD-10-CM

## 2025-04-22 DIAGNOSIS — I25.10 CORONARY ARTERY DISEASE INVOLVING NATIVE CORONARY ARTERY OF NATIVE HEART WITHOUT ANGINA PECTORIS: ICD-10-CM

## 2025-04-22 DIAGNOSIS — E74.819 DISORDERS OF GLUCOSE TRANSPORT, UNSPECIFIED: ICD-10-CM

## 2025-04-22 DIAGNOSIS — Z00.00 ROUTINE GENERAL MEDICAL EXAMINATION AT A HEALTH CARE FACILITY: Primary | ICD-10-CM

## 2025-04-22 DIAGNOSIS — E78.2 MIXED HYPERLIPIDEMIA: ICD-10-CM

## 2025-04-22 DIAGNOSIS — I10 PRIMARY HYPERTENSION: ICD-10-CM

## 2025-04-22 DIAGNOSIS — N40.0 BENIGN PROSTATIC HYPERPLASIA WITHOUT LOWER URINARY TRACT SYMPTOMS: ICD-10-CM

## 2025-04-22 DIAGNOSIS — Z12.5 SPECIAL SCREENING FOR MALIGNANT NEOPLASM OF PROSTATE: ICD-10-CM

## 2025-04-22 RX ORDER — CYCLOSPORINE 0.5 MG/ML
EMULSION OPHTHALMIC
COMMUNITY
Start: 2025-03-03

## 2025-04-22 RX ORDER — TRIAMCINOLONE ACETONIDE 1 MG/G
CREAM TOPICAL
COMMUNITY
Start: 2025-02-10

## 2025-04-22 NOTE — ASSESSMENT & PLAN NOTE
Orders:    Urinalysis With Microscopic - Urine, Clean Catch    CBC & Differential    Comprehensive Metabolic Panel    Lipid Panel With / Chol / HDL Ratio    TSH    PSA Screen    Hemoglobin A1c

## 2025-04-22 NOTE — PROGRESS NOTES
Subjective   The ABCs of the Annual Wellness Visit  Medicare Wellness Visit      Wilberto Madison is a 84 y.o. patient who presents for a Medicare Wellness Visit.    The following portions of the patient's history were reviewed and   updated as appropriate: allergies, current medications, past family history, past medical history, past social history, past surgical history, and problem list.    Compared to one year ago, the patient's physical   health is the same.  Compared to one year ago, the patient's mental   health is the same.    Recent Hospitalizations:  This patient has had a [facility] admission record on file within the last 365 days.  Current Medical Providers:  Patient Care Team:  Larry Schofield MD (Tony) as PCP - General (Internal Medicine)    Outpatient Medications Prior to Visit   Medication Sig Dispense Refill    amLODIPine (NORVASC) 5 MG tablet TAKE 1 TABLET DAILY 90 tablet 3    apixaban (ELIQUIS) 5 MG tablet tablet Take 1 tablet by mouth 2 (Two) Times a Day. 180 tablet 4    atorvastatin (LIPITOR) 10 MG tablet TAKE 1 TABLET DAILY 90 tablet 3    Cholecalciferol (VITAMIN D3) 5000 units capsule capsule Take 1 capsule by mouth Every Night.      Cyanocobalamin (B-12) 1000 MCG capsule Take 1 capsule by mouth Daily.      cycloSPORINE (RESTASIS) 0.05 % ophthalmic emulsion       DOXYLAMINE SUCCINATE, SLEEP, PO Take 12.5 mg by mouth.      hydroCHLOROthiazide 12.5 MG tablet TAKE 1 TABLET DAILY IN THE MORNING 90 tablet 3    levothyroxine (SYNTHROID, LEVOTHROID) 100 MCG tablet TAKE 1 TABLET DAILY 90 tablet 3    melatonin 1 MG tablet Take  by mouth.      nitroglycerin (Nitrostat) 0.4 MG SL tablet Place 1 tablet under the tongue Every 5 (Five) Minutes As Needed for Chest Pain. Take no more than 3 doses in 15 minutes. 25 tablet 1    Polyethyl Glycol-Propyl Glycol (SYSTANE OP) Apply  to eye(s) as directed by provider.      sotalol (BETAPACE) 80 MG tablet TAKE 1 TABLET TWICE A  tablet 3     "tamsulosin (FLOMAX) 0.4 MG capsule 24 hr capsule TAKE 1 CAPSULE TWICE A  capsule 3    triamcinolone (KENALOG) 0.1 % cream  (Patient not taking: Reported on 4/22/2025)       No facility-administered medications prior to visit.     No opioid medication identified on active medication list. I have reviewed chart for other potential  high risk medication/s and harmful drug interactions in the elderly.      Aspirin is not on active medication list.  Aspirin use is not indicated based on review of current medical condition/s. Risk of harm outweighs potential benefits.  .    Patient Active Problem List   Diagnosis    Persistent atrial fibrillation    Hyperlipidemia    Hypertension    Benign prostatic hyperplasia without lower urinary tract symptoms    Coronary artery disease involving native coronary artery of native heart without angina pectoris    TIA (transient ischemic attack)     Advance Care Planning Advance Directive is on file.  ACP discussion was held with the patient during this visit. Patient has an advance directive in EMR which is still valid.             Objective   Vitals:    04/22/25 1024   BP: 108/58   BP Location: Left arm   Patient Position: Sitting   Cuff Size: Large Adult   Pulse: 76   Resp: 16   Temp: 96.7 °F (35.9 °C)   TempSrc: Temporal   SpO2: 96%   Weight: 87.6 kg (193 lb 3.2 oz)   Height: 177.8 cm (70\")       Estimated body mass index is 27.72 kg/m² as calculated from the following:    Height as of this encounter: 177.8 cm (70\").    Weight as of this encounter: 87.6 kg (193 lb 3.2 oz).    BMI is >= 25 and <30. (Overweight) The following options were offered after discussion;: exercise counseling/recommendations           Does the patient have evidence of cognitive impairment? No                                                                                               Health  Risk Assessment    Smoking Status:  Social History     Tobacco Use   Smoking Status Former    Current packs/day: " 0.00    Types: Pipe, Cigarettes    Quit date: 1980    Years since quittin.3    Passive exposure: Past   Smokeless Tobacco Never   Tobacco Comments    Never inhaled. Occasional use at desk.     Alcohol Consumption:  Social History     Substance and Sexual Activity   Alcohol Use Yes    Alcohol/week: 6.0 standard drinks of alcohol    Types: 6 Glasses of wine per week    Comment: One glass per day. No increase over timr.       Fall Risk Screen  [facility] Fall Risk Assessment was completed, and patient is at LOW risk for falls.Assessment completed on:2025    Depression Screening   Little interest or pleasure in doing things? Not at all   Feeling down, depressed, or hopeless? Not at all   PHQ-2 Total Score 0      Health Habits and Functional and Cognitive Screenin/22/2025    10:27 AM   Functional & Cognitive Status   Do you have difficulty preparing food and eating? No   Do you have difficulty bathing yourself, getting dressed or grooming yourself? No   Do you have difficulty using the toilet? No   Do you have difficulty moving around from place to place? No   Do you have trouble with steps or getting out of a bed or a chair? No   Current Diet Well Balanced Diet   Dental Exam Up to date   Exercise (times per week) 3 times per week   Current Exercises Include Walking   Do you need help using the phone?  No   Are you deaf or do you have serious difficulty hearing?  No   Do you need help to go to places out of walking distance? No   Do you need help shopping? No   Do you need help preparing meals?  No   Do you need help with housework?  No   Do you need help with laundry? No   Do you need help taking your medications? No   Do you need help managing money? No   Do you ever drive or ride in a car without wearing a seat belt? No   Have you felt unusual stress, anger or loneliness in the last month? No   Who do you live with? Community   If you need help, do you have trouble finding someone available to you?  No   Have you been bothered in the last four weeks by sexual problems? No   Do you have difficulty concentrating, remembering or making decisions? No           Age-appropriate Screening Schedule:  Refer to the list below for future screening recommendations based on patient's age, sex and/or medical conditions. Orders for these recommended tests are listed in the plan section. The patient has been provided with a written plan.    Health Maintenance List  Health Maintenance   Topic Date Due    COVID-19 Vaccine (6 - 2024-25 season) 09/01/2024    ANNUAL WELLNESS VISIT  04/15/2025    TDAP/TD VACCINES (2 - Td or Tdap) 04/19/2025    INFLUENZA VACCINE  07/01/2025    LIPID PANEL  09/16/2025    RSV Vaccine - Adults  Completed    Pneumococcal Vaccine 50+  Completed    ZOSTER VACCINE  Completed                                                                                                                                                CMS Preventative Services Quick Reference  Risk Factors Identified During Encounter  Immunizations Discussed/Encouraged: COVID19 and RSV (Respiratory Syncytial Virus)    The above risks/problems have been discussed with the patient.  Pertinent information has been shared with the patient in the After Visit Summary.  An After Visit Summary and PPPS were made available to the patient.    Follow Up:   Next Medicare Wellness visit to be scheduled in 1 year.     Assessment & Plan  Routine general medical examination at a health care facility         Special screening for malignant neoplasm of prostate    Orders:    Urinalysis With Microscopic - Urine, Clean Catch    CBC & Differential    Comprehensive Metabolic Panel    Lipid Panel With / Chol / HDL Ratio    TSH    PSA Screen    Hemoglobin A1c    Persistent atrial fibrillation    Orders:    Urinalysis With Microscopic - Urine, Clean Catch    CBC & Differential    Comprehensive Metabolic Panel    Lipid Panel With / Chol / HDL Ratio    TSH    PSA  Screen    Hemoglobin A1c    Mixed hyperlipidemia       Orders:    Urinalysis With Microscopic - Urine, Clean Catch    CBC & Differential    Comprehensive Metabolic Panel    Lipid Panel With / Chol / HDL Ratio    TSH    PSA Screen    Hemoglobin A1c    Primary hypertension      Orders:    Urinalysis With Microscopic - Urine, Clean Catch    CBC & Differential    Comprehensive Metabolic Panel    Lipid Panel With / Chol / HDL Ratio    TSH    PSA Screen    Hemoglobin A1c    Coronary artery disease involving native coronary artery of native heart without angina pectoris      Orders:    Urinalysis With Microscopic - Urine, Clean Catch    CBC & Differential    Comprehensive Metabolic Panel    Lipid Panel With / Chol / HDL Ratio    TSH    PSA Screen    Hemoglobin A1c    Benign prostatic hyperplasia without lower urinary tract symptoms    Orders:    Urinalysis With Microscopic - Urine, Clean Catch    CBC & Differential    Comprehensive Metabolic Panel    Lipid Panel With / Chol / HDL Ratio    TSH    PSA Screen    Hemoglobin A1c    Disorders of glucose transport, unspecified    Orders:    Hemoglobin A1c    Very nice gentleman here for wellness exam.  Doing good with no specific complaints.  Chronic atrial fibrillation stable on anticoagulation and rate control.  Blood pressure looks very good today.  He lives at the [facility] and seems to like the living conditions.  They do some exercise classes at the facility and the large campus so he can walk quite a bit.  Refills up-to-date.  Recheck lab work and urine.  Follow-up 6 months tentatively or pending results    Follow Up:   Return in about 6 months (around 10/22/2025) for Recheck.

## 2025-04-23 LAB
ALBUMIN SERPL-MCNC: 4.1 G/DL (ref 3.5–5.2)
ALBUMIN/GLOB SERPL: 1.6 G/DL
ALP SERPL-CCNC: 78 U/L (ref 39–117)
ALT SERPL-CCNC: 15 U/L (ref 1–41)
APPEARANCE UR: CLEAR
AST SERPL-CCNC: 16 U/L (ref 1–40)
BACTERIA #/AREA URNS HPF: NORMAL /HPF
BASOPHILS # BLD AUTO: 0.05 10*3/MM3 (ref 0–0.2)
BASOPHILS NFR BLD AUTO: 0.8 % (ref 0–1.5)
BILIRUB SERPL-MCNC: 0.8 MG/DL (ref 0–1.2)
BILIRUB UR QL STRIP: NEGATIVE
BUN SERPL-MCNC: 14 MG/DL (ref 8–23)
BUN/CREAT SERPL: 12.1 (ref 7–25)
CALCIUM SERPL-MCNC: 9.4 MG/DL (ref 8.6–10.5)
CASTS URNS MICRO: NORMAL
CHLORIDE SERPL-SCNC: 97 MMOL/L (ref 98–107)
CHOLEST SERPL-MCNC: 122 MG/DL (ref 0–200)
CHOLEST/HDLC SERPL: 2.98 {RATIO}
CO2 SERPL-SCNC: 28 MMOL/L (ref 22–29)
COLOR UR: YELLOW
CREAT SERPL-MCNC: 1.16 MG/DL (ref 0.76–1.27)
EGFRCR SERPLBLD CKD-EPI 2021: 61.3 ML/MIN/1.73
EOSINOPHIL # BLD AUTO: 0.2 10*3/MM3 (ref 0–0.4)
EOSINOPHIL NFR BLD AUTO: 3.1 % (ref 0.3–6.2)
EPI CELLS #/AREA URNS HPF: NORMAL /HPF
ERYTHROCYTE [DISTWIDTH] IN BLOOD BY AUTOMATED COUNT: 12.3 % (ref 12.3–15.4)
GLOBULIN SER CALC-MCNC: 2.5 GM/DL
GLUCOSE SERPL-MCNC: 85 MG/DL (ref 65–99)
GLUCOSE UR QL STRIP: NEGATIVE
HBA1C MFR BLD: 5.9 % (ref 4.8–5.6)
HCT VFR BLD AUTO: 47.8 % (ref 37.5–51)
HDLC SERPL-MCNC: 41 MG/DL (ref 40–60)
HGB BLD-MCNC: 16.1 G/DL (ref 13–17.7)
HGB UR QL STRIP: NEGATIVE
IMM GRANULOCYTES # BLD AUTO: 0.02 10*3/MM3 (ref 0–0.05)
IMM GRANULOCYTES NFR BLD AUTO: 0.3 % (ref 0–0.5)
KETONES UR QL STRIP: NEGATIVE
LDLC SERPL CALC-MCNC: 65 MG/DL (ref 0–100)
LEUKOCYTE ESTERASE UR QL STRIP: NEGATIVE
LYMPHOCYTES # BLD AUTO: 0.81 10*3/MM3 (ref 0.7–3.1)
LYMPHOCYTES NFR BLD AUTO: 12.5 % (ref 19.6–45.3)
MCH RBC QN AUTO: 31 PG (ref 26.6–33)
MCHC RBC AUTO-ENTMCNC: 33.7 G/DL (ref 31.5–35.7)
MCV RBC AUTO: 92.1 FL (ref 79–97)
MONOCYTES # BLD AUTO: 0.68 10*3/MM3 (ref 0.1–0.9)
MONOCYTES NFR BLD AUTO: 10.5 % (ref 5–12)
NEUTROPHILS # BLD AUTO: 4.71 10*3/MM3 (ref 1.7–7)
NEUTROPHILS NFR BLD AUTO: 72.8 % (ref 42.7–76)
NITRITE UR QL STRIP: NEGATIVE
NRBC BLD AUTO-RTO: 0 /100 WBC (ref 0–0.2)
PH UR STRIP: 7 [PH] (ref 5–8)
PLATELET # BLD AUTO: 209 10*3/MM3 (ref 140–450)
POTASSIUM SERPL-SCNC: 4.1 MMOL/L (ref 3.5–5.2)
PROT SERPL-MCNC: 6.6 G/DL (ref 6–8.5)
PROT UR QL STRIP: NEGATIVE
PSA SERPL-MCNC: 5.46 NG/ML (ref 0–4)
RBC # BLD AUTO: 5.19 10*6/MM3 (ref 4.14–5.8)
RBC #/AREA URNS HPF: NORMAL /HPF
SODIUM SERPL-SCNC: 134 MMOL/L (ref 136–145)
SP GR UR STRIP: 1.01 (ref 1–1.03)
TRIGL SERPL-MCNC: 80 MG/DL (ref 0–150)
TSH SERPL DL<=0.005 MIU/L-ACNC: 2.5 UIU/ML (ref 0.27–4.2)
UROBILINOGEN UR STRIP-MCNC: NORMAL MG/DL
VLDLC SERPL CALC-MCNC: 16 MG/DL (ref 5–40)
WBC # BLD AUTO: 6.47 10*3/MM3 (ref 3.4–10.8)
WBC #/AREA URNS HPF: NORMAL /HPF

## 2025-05-21 ENCOUNTER — OFFICE VISIT (OUTPATIENT)
Dept: CARDIOLOGY | Age: 85
End: 2025-05-21
Payer: MEDICARE

## 2025-05-21 VITALS
OXYGEN SATURATION: 97 % | WEIGHT: 195 LBS | HEART RATE: 81 BPM | DIASTOLIC BLOOD PRESSURE: 80 MMHG | BODY MASS INDEX: 27.92 KG/M2 | SYSTOLIC BLOOD PRESSURE: 120 MMHG | HEIGHT: 70 IN

## 2025-05-21 DIAGNOSIS — I25.10 CORONARY ARTERY DISEASE INVOLVING NATIVE CORONARY ARTERY OF NATIVE HEART WITHOUT ANGINA PECTORIS: Primary | ICD-10-CM

## 2025-05-21 DIAGNOSIS — I48.19 PERSISTENT ATRIAL FIBRILLATION: ICD-10-CM

## 2025-05-21 DIAGNOSIS — E78.2 MIXED HYPERLIPIDEMIA: ICD-10-CM

## 2025-05-21 DIAGNOSIS — I10 PRIMARY HYPERTENSION: ICD-10-CM

## 2025-05-21 PROCEDURE — 99214 OFFICE O/P EST MOD 30 MIN: CPT | Performed by: FAMILY MEDICINE

## 2025-05-21 PROCEDURE — 93000 ELECTROCARDIOGRAM COMPLETE: CPT | Performed by: FAMILY MEDICINE

## 2025-05-21 NOTE — PROGRESS NOTES
Date of Office Visit: 2025  Encounter Provider: JENNIFER Barajas  Place of Service: [facility] CARDIOLOGY  Established cardiologist: Any Linn MD  Patient Name: Wilberto Madison  :1940      Patient ID:  Wilberto Madison is a 84 y.o. male is here for  followup    With a pertinent medical history of myocardial infarction with angioplasty done in , paroxysmal atrial fibrillation, hypertension, hyperlipidemia, erectile dysfunction.     His father and brother both had heart disease and his mother strokes.     He is , has 3 children, is retired , has a couple cups of coffee per day and 1 glass of wine daily.  He smoked a pipe but quit .His wife   and he resides at the [facility].  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     History of Present Illness  He had a nonischemic stress nuclear perfusion study done 10/17/2019.     He had an echocardiogram done 11/3/2022 showing ejection fraction of 66% with normal diastolic function.     Echocardiogram done 2024 showed ejection fraction 61% with normal RVSP, mild dilation of aortic root at 4.1 cm, mild mitral and tricuspid insufficiency.     2024 he was admitted after having trouble with speech.  He had forgotten to take medications that morning as well as fire alarm went off in the dining room and this really startled him.  CTA head and neck showed no significant stenosis or occlusion.  He was changed from Xarelto to Eliquis aspirin was not added.  MRI brain done 2024 with no acute abnormalities.    He last saw Dr. Linn in the office 2024.  His blood pressure was elevated.  Amlodipine was increased to 5 mg nightly and he remained on HCTZ 12.5 mg daily.    Today Luigi tells me Still works out at the [employer] a few days a week and he goes on walks.  No exertional  difficulties or complaints.  He does have paroxysmal atrial fibrillation and sometimes he notices this and sometimes he does not.  Usually it is associated with fatigue. He is considering a [employer] mobile for personal monitoring. No cp no soa.       Current Outpatient Medications on File Prior to Visit   Medication Sig Dispense Refill    amLODIPine (NORVASC) 5 MG tablet TAKE 1 TABLET DAILY 90 tablet 3    apixaban (ELIQUIS) 5 MG tablet tablet Take 1 tablet by mouth 2 (Two) Times a Day. 180 tablet 4    atorvastatin (LIPITOR) 10 MG tablet TAKE 1 TABLET DAILY 90 tablet 3    Cholecalciferol (VITAMIN D3) 5000 units capsule capsule Take 1 capsule by mouth Every Night.      Cyanocobalamin (B-12) 1000 MCG capsule Take 1 capsule by mouth Daily.      cycloSPORINE (RESTASIS) 0.05 % ophthalmic emulsion       DOXYLAMINE SUCCINATE, SLEEP, PO Take 12.5 mg by mouth.      hydroCHLOROthiazide 12.5 MG tablet TAKE 1 TABLET DAILY IN THE MORNING 90 tablet 3    levothyroxine (SYNTHROID, LEVOTHROID) 100 MCG tablet TAKE 1 TABLET DAILY 90 tablet 3    melatonin 1 MG tablet Take  by mouth.      nitroglycerin (Nitrostat) 0.4 MG SL tablet Place 1 tablet under the tongue Every 5 (Five) Minutes As Needed for Chest Pain. Take no more than 3 doses in 15 minutes. 25 tablet 1    Polyethyl Glycol-Propyl Glycol (SYSTANE OP) Apply  to eye(s) as directed by provider.      sotalol (BETAPACE) 80 MG tablet TAKE 1 TABLET TWICE A  tablet 3    tamsulosin (FLOMAX) 0.4 MG capsule 24 hr capsule TAKE 1 CAPSULE TWICE A  capsule 3    triamcinolone (KENALOG) 0.1 % cream        No current facility-administered medications on file prior to visit.         Procedures    ECG 12 Lead    Date/Time: 5/21/2025 4:17 PM  Performed by: Nicci Hagan APRN    Authorized by: Nicci Hagan APRN  Comparison: compared with previous ECG from 11/20/2024  Rhythm: atrial fibrillation  BPM: 81              Objective:      Vitals:    05/21/25 1431   BP: 120/80  "  Pulse: 81   SpO2: 97%   Weight: 88.5 kg (195 lb)   Height: 177.8 cm (70\")     Body mass index is 27.98 kg/m².  Wt Readings from Last 3 Encounters:   05/21/25 88.5 kg (195 lb)   04/22/25 87.6 kg (193 lb 3.2 oz)   11/20/24 88.5 kg (195 lb)     Constitutional:       General: Not in acute distress.     Appearance: Not diaphoretic.   Neck:      Vascular: No carotid bruit or JVD.   Pulmonary:      Effort: Pulmonary effort is normal.      Breath sounds: Normal breath sounds.   Cardiovascular:      Normal rate. Regular rhythm.      Murmurs: There is no murmur.      No gallop.  No rub.   Pulses:     Intact distal pulses.      Carotid: 2+ bilaterally.     Radial: 2+ bilaterally.     Dorsalis pedis: 2+ bilaterally.     Posterior tibial: 2+ bilaterally.  Edema:     Peripheral edema absent.   Neurological:      Cranial Nerves: No cranial nerve deficit.            Lab Review:   4/22/2025 hemoglobin A1c 5.90.  TSH 2.50.  Total cholesterol 122 TG 80 HDL 41 LDL 65.CMP was unremarkable.  CBC was unremarkable.    Assessment:     1. Coronary artery disease involving native coronary artery of native heart without angina pectoris    2. Primary hypertension    3. Mixed hyperlipidemia    4. Persistent atrial fibrillation      CAD with history of angioplasty to LAD in 1988, no angina.  Hypertension, goal less than 120/80, increase amlodipine to 5 mg nightly and remain on hydrochlorothiazide 12.5 mg daily.  He was taking 2.5 of amlodipine but his blood pressures running 150s/80s.  PAF, on sotalol and Eliquis.  Hyperlipidemia, on atorvastatin.    Plan:   No medication changes were made  Overall he is stable  He will see Dr. Linn in 6 months      Thank you for allowing me to participate in this patient's care. Please call with any questions or concerns.          Dragon dictation device was utilized in this note.   "

## 2025-05-28 ENCOUNTER — TELEPHONE (OUTPATIENT)
Dept: INTERNAL MEDICINE | Facility: CLINIC | Age: 85
End: 2025-05-28
Payer: MEDICARE

## 2025-05-28 NOTE — TELEPHONE ENCOUNTER
"  Caller: Wilberto Madison \"Del\"    Relationship: Self    Best call back number: [phone]      What is the best time to reach you: ANYTIME     Who are you requesting to speak with (clinical staff, provider,  specific staff member): CLINICAL     What was the call regarding: PATIENT IS REQUESTING A CALL BACK FROM HIS PCP TO DISCUSS SOME INTERMITTENT PAIN HE IS EXPERIENCING JUST BELOW HIS RIBS ON THE LEFT SIDE.       PATIENT STATES THAT HE HAS BEEN EXPERIENCING THIS PAIN FOR ABOUT TWO DAYS NOW, AND IT COMES RANDOMLY AND LEAVES QUICKLY.     PATIENT STATES THAT HE DOES NOT HAVE ANY ACCOMPANYING SYMPTOMS SUCH AS SHORTNESS OF BREATH, AND THE PAIN IS LOCALIZED AND IT NOT RADIATING ANYWHERE ELSE ON HIS BODY.     PATIENT STATES THAT HE DID SEE HIS CARDIOLOGIST LAST WEEK AND HE DOES NOT BELIEVE THIS PAIN IS HEART RELATED.     PLEASE ADVISE  "

## 2025-06-24 ENCOUNTER — TELEPHONE (OUTPATIENT)
Dept: CARDIOLOGY | Age: 85
End: 2025-06-24
Payer: MEDICARE

## 2025-06-24 NOTE — TELEPHONE ENCOUNTER
"  Caller: Wilberto Madison \"Del\"    Relationship: Self    Best call back number: [phone]      Requested Prescriptions:   Requested Prescriptions     Pending Prescriptions Disp Refills    apixaban (ELIQUIS) 5 MG tablet tablet 180 tablet 4     Sig: Take 1 tablet by mouth 2 (Two) Times a Day.        Pharmacy where request should be sent: [facility] [address] [phone] [facility] [phone]      Last office visit with prescribing clinician: 4/22/2025   Last telemedicine visit with prescribing clinician: Visit date not found   Next office visit with prescribing clinician: 10/21/2025     Additional details provided by patient:     PATIENT IS REQUESTING A REFILL ON ELIQUIS TO BE SENT TO [employer].       Portia Kinsey Rep   06/24/25 11:35 EDT         "

## 2025-06-24 NOTE — TELEPHONE ENCOUNTER
Pt called and left v/m stating he purchased a Cardio machine as EE recommended and he noticed he has been out of rhythm and wants to know what he should do    Please advise

## 2025-06-25 NOTE — TELEPHONE ENCOUNTER
Called pt and notified of providers message. Pt is now scheduled to be seen next week. Pt also stated he will try to figure out how to print it off and bring to the office

## 2025-07-01 ENCOUNTER — OFFICE VISIT (OUTPATIENT)
Dept: CARDIOLOGY | Facility: CLINIC | Age: 85
End: 2025-07-01
Payer: MEDICARE

## 2025-07-01 VITALS
HEART RATE: 58 BPM | SYSTOLIC BLOOD PRESSURE: 124 MMHG | DIASTOLIC BLOOD PRESSURE: 72 MMHG | HEIGHT: 70 IN | WEIGHT: 197.4 LBS | BODY MASS INDEX: 28.26 KG/M2

## 2025-07-01 DIAGNOSIS — I48.19 PERSISTENT ATRIAL FIBRILLATION: Primary | ICD-10-CM

## 2025-07-01 DIAGNOSIS — E78.2 MIXED HYPERLIPIDEMIA: ICD-10-CM

## 2025-07-01 DIAGNOSIS — I10 PRIMARY HYPERTENSION: ICD-10-CM

## 2025-07-01 DIAGNOSIS — I25.10 CORONARY ARTERY DISEASE INVOLVING NATIVE CORONARY ARTERY OF NATIVE HEART WITHOUT ANGINA PECTORIS: ICD-10-CM

## 2025-07-01 PROCEDURE — 93000 ELECTROCARDIOGRAM COMPLETE: CPT | Performed by: FAMILY MEDICINE

## 2025-07-01 PROCEDURE — 99214 OFFICE O/P EST MOD 30 MIN: CPT | Performed by: FAMILY MEDICINE

## 2025-07-01 NOTE — PROGRESS NOTES
Date of Office Visit: 2025  Encounter Provider: JENNIFER Barajas  Place of Service: [facility] CARDIOLOGY  Established cardiologist: Any Linn MD  Patient Name: Wilberto Madison  :1940      Patient ID:  Wilberto Madison is a 84 y.o. male is here for  followup    With a pertinent medical history of myocardial infarction with angioplasty done in , paroxysmal atrial fibrillation, hypertension, hyperlipidemia, erectile dysfunction.      His father and brother both had heart disease and his mother strokes.     He is , has 3 children, is retired , has a couple cups of coffee per day and 1 glass of wine daily.  He smoked a pipe but quit .His wife   and he resides at the [facility].  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     History of Present Illness  He had a nonischemic stress nuclear perfusion study done 10/17/2019.      He had an echocardiogram done 11/3/2022 showing ejection fraction of 66% with normal diastolic function.     Echocardiogram done 2024 showed ejection fraction 61% with normal RVSP, mild dilation of aortic root at 4.1 cm, mild mitral and tricuspid insufficiency.      2024 he was admitted after having trouble with speech.  He had forgotten to take medications that morning as well as fire alarm went off in the dining room and this really startled him.  CTA head and neck showed no significant stenosis or occlusion.  He was changed from Xarelto to Eliquis aspirin was not added.  MRI brain done 2024 with no acute abnormalities.     He last saw Dr. Linn in the office 2024.  His blood pressure was elevated.  Amlodipine was increased to 5 mg nightly and he remained on HCTZ 12.5 mg daily.     2025 he called the office to report [employer] mobile machine telling him he is out of rhythm.    Luigi mcneil  in sinus rhythm and feeling well today.   Tells me when he was in afib on [employer] mobile episode lasted maybe 9-10 days, but he had only mild fatigue and no where near the severe fatigue he had experienced with a fib in the past. He has felt well on his medications and just wonders if there is anything he needs to do for this.   No chest pain or pressure, soa, azul, pnd, lightheadedness, dizziness, presyncope/syncope, leg swelling.         Current Outpatient Medications on File Prior to Visit   Medication Sig Dispense Refill    amLODIPine (NORVASC) 5 MG tablet TAKE 1 TABLET DAILY 90 tablet 3    apixaban (ELIQUIS) 5 MG tablet tablet Take 1 tablet by mouth 2 (Two) Times a Day. 180 tablet 4    atorvastatin (LIPITOR) 10 MG tablet TAKE 1 TABLET DAILY 90 tablet 3    Cholecalciferol (VITAMIN D3) 5000 units capsule capsule Take 1 capsule by mouth Every Night.      Cyanocobalamin (B-12) 1000 MCG capsule Take 1 capsule by mouth Daily.      cycloSPORINE (RESTASIS) 0.05 % ophthalmic emulsion       DOXYLAMINE SUCCINATE, SLEEP, PO Take 12.5 mg by mouth.      hydroCHLOROthiazide 12.5 MG tablet TAKE 1 TABLET DAILY IN THE MORNING 90 tablet 3    levothyroxine (SYNTHROID, LEVOTHROID) 100 MCG tablet TAKE 1 TABLET DAILY 90 tablet 3    Melatonin 3 MG capsule Take  by mouth Every Night.      nitroglycerin (Nitrostat) 0.4 MG SL tablet Place 1 tablet under the tongue Every 5 (Five) Minutes As Needed for Chest Pain. Take no more than 3 doses in 15 minutes. 25 tablet 1    Polyethyl Glycol-Propyl Glycol (SYSTANE OP) Apply  to eye(s) as directed by provider.      sotalol (BETAPACE) 80 MG tablet TAKE 1 TABLET TWICE A  tablet 3    tamsulosin (FLOMAX) 0.4 MG capsule 24 hr capsule TAKE 1 CAPSULE TWICE A  capsule 3    triamcinolone (KENALOG) 0.1 % cream        No current facility-administered medications on file prior to visit.         Procedures    ECG 12 Lead    Date/Time: 7/1/2025 3:35 PM  Performed by: Nicci Hagan  "APRN    Authorized by: Nicci Hagan APRN  Comparison: compared with previous ECG from 5/21/2025  Rhythm: sinus rhythm  BPM: 58            Objective:      Vitals:    07/01/25 1454   BP: 124/72   Pulse: 58   Weight: 89.5 kg (197 lb 6.4 oz)   Height: 177.8 cm (70\")     Body mass index is 28.32 kg/m².  Wt Readings from Last 3 Encounters:   07/01/25 89.5 kg (197 lb 6.4 oz)   05/21/25 88.5 kg (195 lb)   04/22/25 87.6 kg (193 lb 3.2 oz)     Constitutional:       General: Not in acute distress.     Appearance: Not diaphoretic.   Neck:      Vascular: No carotid bruit or JVD.   Pulmonary:      Effort: Pulmonary effort is normal.      Breath sounds: Normal breath sounds.   Cardiovascular:      Normal rate. Regular rhythm.      Murmurs: There is no murmur.      No gallop.  No rub.   Pulses:     Intact distal pulses.      Carotid: 2+ bilaterally.     Radial: 2+ bilaterally.     Dorsalis pedis: 2+ bilaterally.     Posterior tibial: 2+ bilaterally.  Edema:     Peripheral edema absent.   Neurological:      Cranial Nerves: No cranial nerve deficit.     Lab Review:   4/22/2025 hemoglobin A1c 5.90. TSH 2.50. Total cholesterol 122 TG 80 HDL 41 LDL 65.CMP was unremarkable. CBC was unremarkable.     Assessment:     1. Persistent atrial fibrillation    2. Coronary artery disease involving native coronary artery of native heart without angina pectoris    3. Primary hypertension    4. Mixed hyperlipidemia      CAD with history of angioplasty to LAD in 1988, no angina.  Hypertension, goal less than 120/80, increase amlodipine to 5 mg nightly and remain on hydrochlorothiazide 12.5 mg daily.  He was taking 2.5 of amlodipine but his blood pressures running 150s/80s.  PAF, on sotalol and Eliquis.  Hyperlipidemia, on atorvastatin.    Plan:   No medication changes were made  He is feeling well and mostly asymptomatic even when he is in a fib. His heart rate has not been high.   In sinus today  No medications to be changed today  Continue bb " and AC  We discussed EP referral in future if he has prolonged episodes of a fib that are accompanied with any new symptoms, but at this time he is doing well.   He will keep appt in the fall and call to be seen sooner with any concerns.           Thank you for allowing me to participate in this patient's care. Please call with any questions or concerns.          Dragon dictation device was utilized in this note.

## 2025-07-30 RX ORDER — ATORVASTATIN CALCIUM 10 MG/1
10 TABLET, FILM COATED ORAL DAILY
Qty: 90 TABLET | Refills: 3 | Status: SHIPPED | OUTPATIENT
Start: 2025-07-30